# Patient Record
Sex: FEMALE | Race: WHITE | Employment: OTHER | URBAN - NONMETROPOLITAN AREA
[De-identification: names, ages, dates, MRNs, and addresses within clinical notes are randomized per-mention and may not be internally consistent; named-entity substitution may affect disease eponyms.]

---

## 2021-01-16 ENCOUNTER — HOSPITAL ENCOUNTER (EMERGENCY)
Age: 51
Discharge: HOME | End: 2021-01-16
Attending: EMERGENCY MEDICINE
Payer: MEDICAID

## 2021-01-16 ENCOUNTER — APPOINTMENT (OUTPATIENT)
Dept: CT IMAGING | Age: 51
End: 2021-01-16
Attending: EMERGENCY MEDICINE
Payer: MEDICAID

## 2021-01-16 VITALS
HEART RATE: 89 BPM | OXYGEN SATURATION: 100 % | BODY MASS INDEX: 17.93 KG/M2 | SYSTOLIC BLOOD PRESSURE: 122 MMHG | RESPIRATION RATE: 17 BRPM | WEIGHT: 105 LBS | HEIGHT: 64 IN | DIASTOLIC BLOOD PRESSURE: 78 MMHG | TEMPERATURE: 98.4 F

## 2021-01-16 DIAGNOSIS — N20.0 KIDNEY STONE: Primary | ICD-10-CM

## 2021-01-16 LAB
ALBUMIN SERPL-MCNC: 4.3 G/DL (ref 3.5–4.7)
ALBUMIN/GLOB SERPL: 1.8 {RATIO}
ALP SERPL-CCNC: 88 U/L (ref 38–126)
ALT SERPL-CCNC: 19 U/L (ref 3–52)
ANION GAP SERPL CALC-SCNC: 7 MMOL/L
APPEARANCE UR: ABNORMAL
AST SERPL W P-5'-P-CCNC: 18 U/L (ref 14–74)
BACTERIA URNS QL MICRO: ABNORMAL /HPF
BASOPHILS # BLD: 0 K/UL
BASOPHILS NFR BLD: 0 %
BILIRUB SERPL-MCNC: 0.8 MG/DL (ref 0.2–1)
BILIRUB UR QL: NEGATIVE
BUN SERPL-MCNC: 20 MG/DL (ref 9–21)
BUN/CREAT SERPL: 25
CA-I BLD-MCNC: 9.3 MG/DL (ref 8.5–10.5)
CHLORIDE SERPL-SCNC: 108 MMOL/L (ref 94–111)
CO2 SERPL-SCNC: 22 MMOL/L (ref 21–33)
COLOR UR: ABNORMAL
CREAT SERPL-MCNC: 0.8 MG/DL (ref 0.7–1.2)
DIFFERENTIAL METHOD BLD: ABNORMAL
EOSINOPHIL # BLD: 0.2 K/UL
EOSINOPHIL NFR BLD: 1 %
EPITH CASTS URNS QL MICRO: ABNORMAL /LPF (ref 0–20)
ERYTHROCYTE [DISTWIDTH] IN BLOOD BY AUTOMATED COUNT: 13.6 % (ref 11.6–14.5)
GLOBULIN SER CALC-MCNC: 2.4 G/DL
GLUCOSE SERPL-MCNC: 90 MG/DL (ref 70–110)
GLUCOSE UR STRIP.AUTO-MCNC: NEGATIVE MG/DL
HCT VFR BLD AUTO: 39.2 % (ref 35–45)
HGB BLD-MCNC: 13 G/DL (ref 12–16)
HGB UR QL STRIP: ABNORMAL
IMM GRANULOCYTES # BLD AUTO: 0 K/UL
IMM GRANULOCYTES NFR BLD AUTO: 0 %
KETONES UR QL STRIP.AUTO: NEGATIVE MG/DL
LEUKOCYTE ESTERASE UR QL STRIP.AUTO: NEGATIVE
LIPASE SERPL-CCNC: 24 U/L (ref 10–57)
LYMPHOCYTES # BLD: 3.2 K/UL
LYMPHOCYTES NFR BLD: 16 %
MCH RBC QN AUTO: 31.5 PG (ref 24–34)
MCHC RBC AUTO-ENTMCNC: 33.2 G/DL (ref 31–37)
MCV RBC AUTO: 94.9 FL (ref 74–97)
MONOCYTES # BLD: 2 K/UL
MONOCYTES NFR BLD: 10 %
NEUTS BAND NFR BLD MANUAL: 4 %
NEUTS SEG # BLD: 14.4 K/UL
NEUTS SEG NFR BLD: 69 %
NITRITE UR QL STRIP.AUTO: NEGATIVE
PH UR STRIP: 6 [PH] (ref 5–9)
PLATELET # BLD AUTO: 320 K/UL (ref 135–420)
PMV BLD AUTO: 10.5 FL (ref 9.2–11.8)
POTASSIUM SERPL-SCNC: 3.5 MMOL/L (ref 3.2–5.1)
PROT SERPL-MCNC: 6.7 G/DL (ref 6.1–8.4)
PROT UR STRIP-MCNC: NEGATIVE MG/DL
RBC # BLD AUTO: 4.13 M/UL (ref 4.2–5.3)
RBC #/AREA URNS HPF: ABNORMAL /HPF (ref 0–2)
RBC MORPH BLD: ABNORMAL
SODIUM SERPL-SCNC: 137 MMOL/L (ref 135–145)
SP GR UR REFRACTOMETRY: 1.02 (ref 1–1.03)
UROBILINOGEN UR QL STRIP.AUTO: 0.2 EU/DL (ref 0.2–1)
WBC # BLD AUTO: 19.8 K/UL (ref 4.6–13.2)
WBC URNS QL MICRO: ABNORMAL /HPF (ref 0–4)

## 2021-01-16 PROCEDURE — 96375 TX/PRO/DX INJ NEW DRUG ADDON: CPT

## 2021-01-16 PROCEDURE — 81001 URINALYSIS AUTO W/SCOPE: CPT

## 2021-01-16 PROCEDURE — 36415 COLL VENOUS BLD VENIPUNCTURE: CPT

## 2021-01-16 PROCEDURE — 85025 COMPLETE CBC W/AUTO DIFF WBC: CPT

## 2021-01-16 PROCEDURE — 83690 ASSAY OF LIPASE: CPT

## 2021-01-16 PROCEDURE — 80053 COMPREHEN METABOLIC PANEL: CPT

## 2021-01-16 PROCEDURE — 96374 THER/PROPH/DIAG INJ IV PUSH: CPT

## 2021-01-16 PROCEDURE — 74011250636 HC RX REV CODE- 250/636: Performed by: EMERGENCY MEDICINE

## 2021-01-16 PROCEDURE — 74176 CT ABD & PELVIS W/O CONTRAST: CPT

## 2021-01-16 PROCEDURE — 99284 EMERGENCY DEPT VISIT MOD MDM: CPT

## 2021-01-16 RX ORDER — HYDROCODONE BITARTRATE AND ACETAMINOPHEN 5; 325 MG/1; MG/1
1 TABLET ORAL
Qty: 12 TAB | Refills: 0 | Status: SHIPPED | OUTPATIENT
Start: 2021-01-16 | End: 2021-01-19

## 2021-01-16 RX ORDER — PRAVASTATIN SODIUM 40 MG/1
1 TABLET ORAL
COMMUNITY
Start: 2020-12-04

## 2021-01-16 RX ORDER — VARENICLINE TARTRATE 1 MG/1
1 TABLET, FILM COATED ORAL DAILY
COMMUNITY
Start: 2020-12-04

## 2021-01-16 RX ORDER — KETOROLAC TROMETHAMINE 30 MG/ML
15 INJECTION, SOLUTION INTRAMUSCULAR; INTRAVENOUS
Status: COMPLETED | OUTPATIENT
Start: 2021-01-16 | End: 2021-01-16

## 2021-01-16 RX ORDER — ONDANSETRON 2 MG/ML
4 INJECTION INTRAMUSCULAR; INTRAVENOUS
Status: COMPLETED | OUTPATIENT
Start: 2021-01-16 | End: 2021-01-16

## 2021-01-16 RX ADMIN — ONDANSETRON 4 MG: 2 INJECTION INTRAMUSCULAR; INTRAVENOUS at 13:31

## 2021-01-16 RX ADMIN — KETOROLAC TROMETHAMINE 15 MG: 30 INJECTION, SOLUTION INTRAMUSCULAR at 13:34

## 2021-01-16 NOTE — DISCHARGE INSTRUCTIONS
Patient Education        Kidney Stone: Care Instructions  Your Care Instructions     Kidney stones are formed when salts, minerals, and other substances normally found in the urine clump together. They can be as small as grains of sand or, rarely, as large as golf balls. While the stone is traveling through the ureter, which is the tube that carries urine from the kidney to the bladder, you will probably feel pain. The pain may be mild or very severe. You may also have some blood in your urine. As soon as the stone reaches the bladder, any intense pain should go away. If a stone is too large to pass on its own, you may need a medical procedure to help you pass the stone. The doctor has checked you carefully, but problems can develop later. If you notice any problems or new symptoms, get medical treatment right away. Follow-up care is a key part of your treatment and safety. Be sure to make and go to all appointments, and call your doctor if you are having problems. It's also a good idea to know your test results and keep a list of the medicines you take. How can you care for yourself at home? · Drink plenty of fluids, enough so that your urine is light yellow or clear like water. If you have kidney, heart, or liver disease and have to limit fluids, talk with your doctor before you increase the amount of fluids you drink. · Take pain medicines exactly as directed. Call your doctor if you think you are having a problem with your medicine. ? If the doctor gave you a prescription medicine for pain, take it as prescribed. ? If you are not taking a prescription pain medicine, ask your doctor if you can take an over-the-counter medicine. Read and follow all instructions on the label. · Your doctor may ask you to strain your urine so that you can collect your kidney stone when it passes. You can use a kitchen strainer or a tea strainer to catch the stone.  Store it in a plastic bag until you see your doctor again.  Preventing future kidney stones  Some changes in your diet may help prevent kidney stones. Depending on the cause of your stones, your doctor may recommend that you:  · Drink plenty of fluids, enough so that your urine is light yellow or clear like water. If you have kidney, heart, or liver disease and have to limit fluids, talk with your doctor before you increase the amount of fluids you drink.  · Limit coffee, tea, and alcohol. Also avoid grapefruit juice.  · Do not take more than the recommended daily dose of vitamins C and D.  · Avoid antacids such as Gaviscon, Maalox, Mylanta, or Tums.  · Limit the amount of salt (sodium) in your diet.  · Eat a balanced diet that is not too high in protein.  · Limit foods that are high in a substance called oxalate, which can cause kidney stones. These foods include dark green vegetables, rhubarb, chocolate, wheat bran, nuts, cranberries, and beans.  When should you call for help?   Call your doctor now or seek immediate medical care if:    · You cannot keep down fluids.     · Your pain gets worse.     · You have a fever or chills.     · You have new or worse pain in your back just below your rib cage (the flank area).     · You have new or more blood in your urine.   Watch closely for changes in your health, and be sure to contact your doctor if:    · You do not get better as expected.   Where can you learn more?  Go to https://www.[employer].net/[employer]  Enter X633 in the search box to learn more about \"Kidney Stone: Care Instructions.\"  Current as of: April 15, 2020               Content Version: 12.6  © 4305-6305 [employer].   Care instructions adapted under license by [employer] (which disclaims liability or warranty for this information). If you have questions about a medical condition or this instruction, always ask your healthcare professional. [employer] disclaims any warranty or liability for your use of this  information.

## 2021-01-16 NOTE — ED PROVIDER NOTES
EMERGENCY DEPARTMENT HISTORY AND PHYSICAL EXAM      Date: 2021  Patient Name: Cristina Jones    History of Presenting Illness     Chief Complaint   Patient presents with    Flank Pain     left side       History Provided By: Patient    HPI: Cristina Jones is a 45 y.o. female with presenting ambulatory to the ED c/o severe, sharp left flank pain x 2 days. Pt states that the pain is waxing/waning. Pt has a h/o kidney stones, and states that this feels similar. Her last kidney stone was in . She has associated sxs today of urgency, nausea, and mild intermittent chills. She denies vomiting or fever. PCP: Ammon Parsons MD    There are no other complaints, changes, or physical findings at this time. No current facility-administered medications on file prior to encounter. Current Outpatient Medications on File Prior to Encounter   Medication Sig Dispense Refill    pravastatin (PRAVACHOL) 40 mg tablet Take 1 Tab by mouth nightly.  Chantix Continuing Month Box 1 mg tablet Take 1 Tab by mouth daily. Past History     Past Medical History:  Past Medical History:   Diagnosis Date    Hypercholesterolemia        Past Surgical History:  Past Surgical History:   Procedure Laterality Date    HX  SECTION      HX CHOLECYSTECTOMY      HX SPLENECTOMY         Family History:  History reviewed. No pertinent family history. Social History:  Social History     Tobacco Use    Smoking status: Current Every Day Smoker     Packs/day: 0.50    Smokeless tobacco: Never Used   Substance Use Topics    Alcohol use: Yes     Comment: rare    Drug use: Never       Allergies: Allergies   Allergen Reactions    Codeine Hives and Swelling    Penicillins Hives and Swelling         Review of Systems   Review of Systems   Constitutional: Positive for chills (intermittent, mild). Negative for fever. HENT: Negative for congestion, ear pain and sore throat. Eyes: Negative for photophobia and pain. Respiratory: Negative for cough and shortness of breath. Cardiovascular: Negative for chest pain. Gastrointestinal: Positive for nausea. Negative for diarrhea and vomiting. Genitourinary: Positive for flank pain (left) and urgency. Negative for dysuria and hematuria. Skin: Negative for color change and rash. Neurological: Negative for dizziness, weakness, light-headedness and numbness. All other systems reviewed and are negative. Physical Exam   Physical Exam  Vitals signs and nursing note reviewed. Constitutional:       General: She is not in acute distress. Appearance: She is well-developed. She is not diaphoretic. HENT:      Head: Normocephalic and atraumatic. Eyes:      General: No scleral icterus. Extraocular Movements:      Right eye: Normal extraocular motion. Left eye: Normal extraocular motion. Conjunctiva/sclera: Conjunctivae normal.      Pupils: Pupils are equal, round, and reactive to light. Neck:      Musculoskeletal: Normal range of motion and neck supple. Trachea: No tracheal deviation. Cardiovascular:      Rate and Rhythm: Normal rate and regular rhythm. Heart sounds: Normal heart sounds. Pulmonary:      Effort: Pulmonary effort is normal. No respiratory distress. Breath sounds: Normal breath sounds. No stridor. Abdominal:      General: Bowel sounds are normal. There is no distension. Palpations: Abdomen is soft. Tenderness: There is abdominal tenderness. There is left CVA tenderness. There is no rebound. Comments: Left suprapubic TTP   Musculoskeletal: Normal range of motion. Skin:     General: Skin is warm and dry. Capillary Refill: Capillary refill takes less than 2 seconds. Findings: No erythema or rash. Neurological:      Mental Status: She is alert and oriented to person, place, and time. Cranial Nerves: No cranial nerve deficit. Motor: No weakness.       Deep Tendon Reflexes: Reflexes are normal and symmetric. Psychiatric:         Mood and Affect: Mood normal.         Behavior: Behavior normal.         Thought Content: Thought content normal.         Judgment: Judgment normal.         Diagnostic Study Results     Labs -     Recent Results (from the past 12 hour(s))   URINALYSIS W/ RFLX MICROSCOPIC    Collection Time: 01/16/21 12:43 PM   Result Value Ref Range    Color Yellow/Straw      Appearance Hazy (A) Clear      Specific gravity 1.020 1.003 - 1.035      pH (UA) 6.0 5.0 - 9.0      Protein Negative Negative mg/dL    Glucose Negative Negative mg/dL    Ketone Negative Negative mg/dL    Bilirubin Negative Negative      Blood Large (A) Negative      Urobilinogen 0.2 0.2 - 1.0 EU/dL    Nitrites Negative Negative      Leukocyte Esterase Negative Negative     URINE MICROSCOPIC    Collection Time: 01/16/21 12:43 PM   Result Value Ref Range    WBC 0-4 0 - 4 /hpf    RBC  0 - 2 /hpf    Epithelial cells Few 0 - 20 /lpf    Bacteria 1+ (A) None /hpf   CBC WITH AUTOMATED DIFF    Collection Time: 01/16/21  1:30 PM   Result Value Ref Range    WBC 19.8 (H) 4.6 - 13.2 K/uL    RBC 4.13 (L) 4.20 - 5.30 M/uL    HGB 13.0 12.0 - 16.0 g/dL    HCT 39.2 35.0 - 45.0 %    MCV 94.9 74.0 - 97.0 FL    MCH 31.5 24.0 - 34.0 PG    MCHC 33.2 31.0 - 37.0 g/dL    RDW 13.6 11.6 - 14.5 %    PLATELET 804 266 - 236 K/uL    MPV 10.5 9.2 - 11.8 FL    NEUTROPHILS 69 %    BAND NEUTROPHILS 4 %    LYMPHOCYTES 16 %    MONOCYTES 10 %    EOSINOPHILS 1 %    BASOPHILS 0 %    IMMATURE GRANULOCYTES 0 %    ABS. NEUTROPHILS 14.4 K/UL    ABS. LYMPHOCYTES 3.2 K/UL    ABS. MONOCYTES 2.0 K/UL    ABS. EOSINOPHILS 0.2 K/UL    ABS. BASOPHILS 0.0 K/UL    ABS. IMM.  GRANS. 0.0 K/UL    DF Manual      RBC COMMENTS Normocytic, Normochromic     METABOLIC PANEL, COMPREHENSIVE    Collection Time: 01/16/21  1:30 PM   Result Value Ref Range    Sodium 137 135 - 145 mmol/L    Potassium 3.5 3.2 - 5.1 mmol/L    Chloride 108 94 - 111 mmol/L    CO2 22 21 - 33 mmol/L Anion gap 7 mmol/L    Glucose 90 70 - 110 mg/dL    BUN 20 9 - 21 mg/dL    Creatinine 0.80 0.70 - 1.20 mg/dL    BUN/Creatinine ratio 25      GFR est AA >60 ml/min/1.73m2    GFR est non-AA >60 ml/min/1.73m2    Calcium 9.3 8.5 - 10.5 mg/dL    Bilirubin, total 0.8 0.2 - 1.0 mg/dL    AST (SGOT) 18 14 - 74 U/L    ALT (SGPT) 19 3 - 52 U/L    Alk. phosphatase 88 38 - 126 U/L    Protein, total 6.7 6.1 - 8.4 g/dL    Albumin 4.3 3.5 - 4.7 g/dL    Globulin 2.4 g/dL    A-G Ratio 1.8     LIPASE    Collection Time: 01/16/21  1:30 PM   Result Value Ref Range    Lipase 24 10 - 57 U/L       Radiologic Studies -   CT ABD PELV WO CONT   Final Result   IMPRESSION:         1. Left distal ureteral stone with moderate hydronephrosis and hydroureter. 2. Bilateral nephrolithiasis. CT Results  (Last 48 hours)               01/16/21 1325  CT ABD PELV WO CONT Final result    Impression:  IMPRESSION:           1. Left distal ureteral stone with moderate hydronephrosis and hydroureter. 2. Bilateral nephrolithiasis. Narrative:  EXAM: CT of the abdomen and pelvis       INDICATION: Pain. COMPARISON: 9/11/2014. TECHNIQUE: Axial CT imaging of the abdomen and pelvis was performed without   intravenous contrast. Multiplanar reformats were generated. One or more dose reduction techniques were used on this CT: automated exposure   control, adjustment of the mAs and/or kVp according to patient size, and   iterative reconstruction techniques. The specific techniques used on this CT   exam have been documented in the patient's electronic medical record. Digital   Imaging and Communications in Medicine (DICOM) format image data are available   to nonaffiliated external healthcare facilities or entities on a secure, media   free, reciprocally searchable basis with patient authorization for at least a   12-month period after this study. _______________       FINDINGS:       LOWER CHEST: Emphysematous changes. LIVER, BILIARY: Liver is normal. No biliary dilation. Gallbladder is surgically   absent. PANCREAS: Normal.       SPLEEN: Surgically absent. ADRENALS: Normal.       KIDNEYS: Left distal ureteral stone measuring 3 mm with moderate hydronephrosis   and hydroureter. Multiple small bilateral nonobstructing 2 mm renal stones. LYMPH NODES: No enlarged lymph nodes. GASTROINTESTINAL TRACT: No bowel dilation or wall thickening. PELVIC ORGANS: Unremarkable. VASCULATURE: Mild atherosclerotic vascular calcifications. BONES: No acute or aggressive osseous abnormalities identified. OTHER: None.       _______________               CXR Results  (Last 48 hours)    None            Medical Decision Making   I am the first provider for this patient. I reviewed the vital signs, available nursing notes, past medical history, past surgical history, family history and social history. Vital Signs-Reviewed the patient's vital signs. Patient Vitals for the past 12 hrs:   Temp Pulse Resp BP SpO2   01/16/21 1244 98.4 °F (36.9 °C) 100 19 136/84 98 %       Records Reviewed: Nursing Notes and Past Medical Records    ED Course:   Initial assessment performed. The patients presenting problems have been discussed, and they are in agreement with the care plan formulated and outlined with them. I have encouraged them to ask questions as they arise throughout their visit. Provider Notes (Medical Decision Making):   INITIAL CLINICAL IMPRESSION and PLANS:  The patient presents with the primary complaint(s) of: flank pain. Possible DX's to consider as primary, associated with, or exacerbated by include:    1. Kidney stone  2. UTI  3. Pyelonephritis  4. Pancreatitis  5. Radiculopathy    Disposition     Disposition: DISCHARGE    DISCHARGE NOTE:  The patient is ready for discharge.  The patient's signs, symptoms, diagnosis, and discharge instructions have been discussed and the patient and/or family has conveyed their understanding. The patient and/or family is to follow up as recommended or return to the ER should their symptoms worsen. Plan has been discussed and the patient and/or family is in agreement. Written by SATYA Sun, as dictated by Dr. Carmen Vu.      Labs Reviewed   CBC WITH AUTOMATED DIFF - Abnormal; Notable for the following components:       Result Value    WBC 19.8 (*)     RBC 4.13 (*)     All other components within normal limits   URINALYSIS W/ RFLX MICROSCOPIC - Abnormal; Notable for the following components:    Appearance Hazy (*)     Blood Large (*)     All other components within normal limits   URINE MICROSCOPIC - Abnormal; Notable for the following components:    Bacteria 1+ (*)     All other components within normal limits   METABOLIC PANEL, COMPREHENSIVE   LIPASE        Recent Results (from the past 12 hour(s))   URINALYSIS W/ RFLX MICROSCOPIC    Collection Time: 01/16/21 12:43 PM   Result Value Ref Range    Color Yellow/Straw      Appearance Hazy (A) Clear      Specific gravity 1.020 1.003 - 1.035      pH (UA) 6.0 5.0 - 9.0      Protein Negative Negative mg/dL    Glucose Negative Negative mg/dL    Ketone Negative Negative mg/dL    Bilirubin Negative Negative      Blood Large (A) Negative      Urobilinogen 0.2 0.2 - 1.0 EU/dL    Nitrites Negative Negative      Leukocyte Esterase Negative Negative     URINE MICROSCOPIC    Collection Time: 01/16/21 12:43 PM   Result Value Ref Range    WBC 0-4 0 - 4 /hpf    RBC  0 - 2 /hpf    Epithelial cells Few 0 - 20 /lpf    Bacteria 1+ (A) None /hpf   CBC WITH AUTOMATED DIFF    Collection Time: 01/16/21  1:30 PM   Result Value Ref Range    WBC 19.8 (H) 4.6 - 13.2 K/uL    RBC 4.13 (L) 4.20 - 5.30 M/uL    HGB 13.0 12.0 - 16.0 g/dL    HCT 39.2 35.0 - 45.0 %    MCV 94.9 74.0 - 97.0 FL    MCH 31.5 24.0 - 34.0 PG    MCHC 33.2 31.0 - 37.0 g/dL    RDW 13.6 11.6 - 14.5 %    PLATELET 021 321 - 518 K/uL    MPV 10.5 9.2 - 11.8 FL NEUTROPHILS 69 %    BAND NEUTROPHILS 4 %    LYMPHOCYTES 16 %    MONOCYTES 10 %    EOSINOPHILS 1 %    BASOPHILS 0 %    IMMATURE GRANULOCYTES 0 %    ABS. NEUTROPHILS 14.4 K/UL    ABS. LYMPHOCYTES 3.2 K/UL    ABS. MONOCYTES 2.0 K/UL    ABS. EOSINOPHILS 0.2 K/UL    ABS. BASOPHILS 0.0 K/UL    ABS. IMM. GRANS. 0.0 K/UL    DF Manual      RBC COMMENTS Normocytic, Normochromic     METABOLIC PANEL, COMPREHENSIVE    Collection Time: 01/16/21  1:30 PM   Result Value Ref Range    Sodium 137 135 - 145 mmol/L    Potassium 3.5 3.2 - 5.1 mmol/L    Chloride 108 94 - 111 mmol/L    CO2 22 21 - 33 mmol/L    Anion gap 7 mmol/L    Glucose 90 70 - 110 mg/dL    BUN 20 9 - 21 mg/dL    Creatinine 0.80 0.70 - 1.20 mg/dL    BUN/Creatinine ratio 25      GFR est AA >60 ml/min/1.73m2    GFR est non-AA >60 ml/min/1.73m2    Calcium 9.3 8.5 - 10.5 mg/dL    Bilirubin, total 0.8 0.2 - 1.0 mg/dL    AST (SGOT) 18 14 - 74 U/L    ALT (SGPT) 19 3 - 52 U/L    Alk. phosphatase 88 38 - 126 U/L    Protein, total 6.7 6.1 - 8.4 g/dL    Albumin 4.3 3.5 - 4.7 g/dL    Globulin 2.4 g/dL    A-G Ratio 1.8     LIPASE    Collection Time: 01/16/21  1:30 PM   Result Value Ref Range    Lipase 24 10 - 57 U/L        CT ABD PELV WO CONT   Final Result   IMPRESSION:         1. Left distal ureteral stone with moderate hydronephrosis and hydroureter. 2. Bilateral nephrolithiasis. CLINICAL IMPRESSION    1. Kidney stone        PLAN:  1. Current Discharge Medication List      START taking these medications    Details   HYDROcodone-acetaminophen (Norco) 5-325 mg per tablet Take 1 Tab by mouth every six (6) hours as needed for Pain for up to 3 days. Max Daily Amount: 4 Tabs. Qty: 12 Tab, Refills: 0    Associated Diagnoses: Kidney stone           2.    Follow-up Information     Follow up With Specialties Details Why Iva Vicente MD Urology Schedule an appointment as soon as possible for a visit for urology follow up [address] [facility] [address] [facility] EMERGENCY DEPT Emergency Medicine  As needed, If symptoms worsen [address]  [phone]        Return to ED if worse     Diagnosis     Clinical Impression:   1. Kidney stone        By signing my name below, Abena Casanova, attest that this documentation has been prepared under the direction and in presence of Dr. Kellie Roger on 01/16/21.  Electronically signed: Malik Alvarenga, 01/16/21, 12:53 PM

## 2021-04-29 ENCOUNTER — HOSPITAL ENCOUNTER (EMERGENCY)
Age: 51
Discharge: HOME | End: 2021-04-30
Attending: EMERGENCY MEDICINE
Payer: MEDICAID

## 2021-04-29 DIAGNOSIS — D72.829 LEUKOCYTOSIS, UNSPECIFIED TYPE: ICD-10-CM

## 2021-04-29 DIAGNOSIS — S31.41XA VAGINAL LACERATION, INITIAL ENCOUNTER: Primary | ICD-10-CM

## 2021-04-29 LAB
ABO + RH BLD: NORMAL
ALBUMIN SERPL-MCNC: 4.2 G/DL (ref 3.5–4.7)
ALBUMIN/GLOB SERPL: 1.3 {RATIO}
ALP SERPL-CCNC: 99 U/L (ref 38–126)
ALT SERPL-CCNC: 23 U/L (ref 3–52)
ANION GAP SERPL CALC-SCNC: 10 MMOL/L
APPEARANCE UR: ABNORMAL
AST SERPL W P-5'-P-CCNC: 22 U/L (ref 14–74)
BACTERIA URNS QL MICRO: ABNORMAL /HPF
BASOPHILS # BLD: 0 K/UL
BASOPHILS NFR BLD: 0 %
BILIRUB SERPL-MCNC: 0.8 MG/DL (ref 0.2–1)
BILIRUB UR QL: NEGATIVE
BLOOD GROUP ANTIBODIES SERPL: NEGATIVE
BUN SERPL-MCNC: 15 MG/DL (ref 9–21)
BUN/CREAT SERPL: 19
CA-I BLD-MCNC: 9.3 MG/DL (ref 8.5–10.5)
CHLORIDE SERPL-SCNC: 106 MMOL/L (ref 94–111)
CO2 SERPL-SCNC: 23 MMOL/L (ref 21–33)
COLOR UR: ABNORMAL
CREAT SERPL-MCNC: 0.8 MG/DL (ref 0.7–1.2)
DIFFERENTIAL METHOD BLD: ABNORMAL
EOSINOPHIL # BLD: 0 K/UL
EOSINOPHIL NFR BLD: 0 %
EPITH CASTS URNS QL MICRO: ABNORMAL /LPF (ref 0–20)
ERYTHROCYTE [DISTWIDTH] IN BLOOD BY AUTOMATED COUNT: 12.9 % (ref 11.6–14.5)
GLOBULIN SER CALC-MCNC: 3.3 G/DL
GLUCOSE SERPL-MCNC: 112 MG/DL (ref 70–110)
GLUCOSE UR STRIP.AUTO-MCNC: NEGATIVE MG/DL
HCT VFR BLD AUTO: 38.5 % (ref 35–45)
HGB BLD-MCNC: 13.2 G/DL (ref 12–16)
HGB UR QL STRIP: ABNORMAL
IMM GRANULOCYTES # BLD AUTO: 0 K/UL
IMM GRANULOCYTES NFR BLD AUTO: 0 %
KETONES UR QL STRIP.AUTO: NEGATIVE MG/DL
LEUKOCYTE ESTERASE UR QL STRIP.AUTO: NEGATIVE
LYMPHOCYTES # BLD: 5.4 K/UL
LYMPHOCYTES NFR BLD: 25 %
MCH RBC QN AUTO: 32.4 PG (ref 24–34)
MCHC RBC AUTO-ENTMCNC: 34.3 G/DL (ref 31–37)
MCV RBC AUTO: 94.4 FL (ref 74–97)
MONOCYTES # BLD: 2 K/UL
MONOCYTES NFR BLD: 9 %
NEUTS SEG # BLD: 14.3 K/UL
NEUTS SEG NFR BLD: 66 %
NITRITE UR QL STRIP.AUTO: NEGATIVE
PH UR STRIP: 6 [PH] (ref 5–9)
PLATELET # BLD AUTO: 328 K/UL (ref 135–420)
PMV BLD AUTO: 11.1 FL (ref 9.2–11.8)
POTASSIUM SERPL-SCNC: 3.4 MMOL/L (ref 3.2–5.1)
PROT SERPL-MCNC: 7.5 G/DL (ref 6.1–8.4)
PROT UR STRIP-MCNC: 30 MG/DL
RBC # BLD AUTO: 4.08 M/UL (ref 4.2–5.3)
RBC #/AREA URNS HPF: ABNORMAL /HPF (ref 0–2)
RBC MORPH BLD: ABNORMAL
SODIUM SERPL-SCNC: 139 MMOL/L (ref 135–145)
SP GR UR REFRACTOMETRY: 1.02 (ref 1–1.03)
SPECIMEN EXP DATE BLD: NORMAL
UROBILINOGEN UR QL STRIP.AUTO: 4 EU/DL (ref 0.2–1)
WBC # BLD AUTO: 21.7 K/UL (ref 4.6–13.2)
WBC URNS QL MICRO: ABNORMAL /HPF (ref 0–4)

## 2021-04-29 PROCEDURE — 99284 EMERGENCY DEPT VISIT MOD MDM: CPT

## 2021-04-29 PROCEDURE — 36415 COLL VENOUS BLD VENIPUNCTURE: CPT

## 2021-04-29 PROCEDURE — 74011250636 HC RX REV CODE- 250/636: Performed by: EMERGENCY MEDICINE

## 2021-04-29 PROCEDURE — 86901 BLOOD TYPING SEROLOGIC RH(D): CPT

## 2021-04-29 PROCEDURE — 96361 HYDRATE IV INFUSION ADD-ON: CPT

## 2021-04-29 PROCEDURE — 85025 COMPLETE CBC W/AUTO DIFF WBC: CPT

## 2021-04-29 PROCEDURE — 81001 URINALYSIS AUTO W/SCOPE: CPT

## 2021-04-29 PROCEDURE — 96360 HYDRATION IV INFUSION INIT: CPT

## 2021-04-29 PROCEDURE — 80053 COMPREHEN METABOLIC PANEL: CPT

## 2021-04-29 RX ORDER — ASPIRIN 81 MG/1
81 TABLET ORAL DAILY
COMMUNITY

## 2021-04-29 RX ADMIN — SODIUM CHLORIDE 1000 ML: 9 INJECTION, SOLUTION INTRAVENOUS at 21:23

## 2021-04-30 VITALS
BODY MASS INDEX: 17.42 KG/M2 | HEART RATE: 78 BPM | TEMPERATURE: 98.1 F | SYSTOLIC BLOOD PRESSURE: 126 MMHG | HEIGHT: 64 IN | RESPIRATION RATE: 18 BRPM | DIASTOLIC BLOOD PRESSURE: 75 MMHG | WEIGHT: 102 LBS | OXYGEN SATURATION: 98 %

## 2021-04-30 RX ORDER — KETOROLAC TROMETHAMINE 10 MG/1
10 TABLET, FILM COATED ORAL ONCE
Status: DISCONTINUED | OUTPATIENT
Start: 2021-04-30 | End: 2021-04-30 | Stop reason: HOSPADM

## 2021-04-30 RX ORDER — KETOROLAC TROMETHAMINE 10 MG/1
10 TABLET, FILM COATED ORAL
Qty: 15 TAB | Refills: 0 | Status: SHIPPED | OUTPATIENT
Start: 2021-04-30 | End: 2021-05-05

## 2021-04-30 NOTE — ED PROVIDER NOTES
HPI   21-year-old  female presents with a chief complaint of severe vaginal bleeding. Patient states that she said the bleeding began after engaging and extremely rough sexual intercourse approximately 5:30 PM.  She denies using any foreign bodies and said that the bleeding started after her sexual partner inserted his penis extremely hard. Patient complains of nausea and dizziness. She has no prior history of STDs and denies any dysuria or vaginal discharge. Past Medical History:   Diagnosis Date    Hypercholesterolemia        Past Surgical History:   Procedure Laterality Date    HX  SECTION      HX CHOLECYSTECTOMY      HX SPLENECTOMY           No family history on file.     Social History     Socioeconomic History    Marital status: LEGALLY      Spouse name: Not on file    Number of children: Not on file    Years of education: Not on file    Highest education level: Not on file   Occupational History    Not on file   Social Needs    Financial resource strain: Not on file    Food insecurity     Worry: Not on file     Inability: Not on file    Transportation needs     Medical: Not on file     Non-medical: Not on file   Tobacco Use    Smoking status: Current Every Day Smoker     Packs/day: 0.50    Smokeless tobacco: Never Used   Substance and Sexual Activity    Alcohol use: Yes     Comment: rare    Drug use: Never    Sexual activity: Not on file   Lifestyle    Physical activity     Days per week: Not on file     Minutes per session: Not on file    Stress: Not on file   Relationships    Social connections     Talks on phone: Not on file     Gets together: Not on file     Attends Confucianism service: Not on file     Active member of club or organization: Not on file     Attends meetings of clubs or organizations: Not on file     Relationship status: Not on file    Intimate partner violence     Fear of current or ex partner: Not on file     Emotionally abused: Not on file Physically abused: Not on file     Forced sexual activity: Not on file   Other Topics Concern    Not on file   Social History Narrative    Not on file         ALLERGIES: Codeine and Penicillins    Review of Systems   Constitutional: Negative for chills, diaphoresis, fatigue, fever and unexpected weight change. HENT: Negative for congestion, dental problem, ear discharge, ear pain, hearing loss, nosebleeds, postnasal drip, sinus pressure, sore throat, trouble swallowing and voice change. Eyes: Negative for photophobia, pain, discharge, redness and visual disturbance. Respiratory: Negative for cough, chest tightness, shortness of breath, wheezing and stridor. Cardiovascular: Negative for chest pain, palpitations and leg swelling. Gastrointestinal: Negative for abdominal distention, abdominal pain, anal bleeding, blood in stool, constipation, diarrhea, nausea and vomiting. Endocrine: Negative for polydipsia, polyphagia and polyuria. Genitourinary: Positive for vaginal bleeding. Negative for difficulty urinating, dysuria, flank pain, frequency, genital sores, hematuria, menstrual problem, pelvic pain, urgency, vaginal discharge and vaginal pain. Musculoskeletal: Negative for arthralgias, back pain, joint swelling, myalgias, neck pain and neck stiffness. Skin: Negative for color change, rash and wound. Allergic/Immunologic: Negative for immunocompromised state. Neurological: Positive for dizziness and light-headedness. Negative for tremors, seizures, syncope, weakness, numbness and headaches. Hematological: Negative for adenopathy. Does not bruise/bleed easily. Psychiatric/Behavioral: Negative for agitation, confusion, decreased concentration, hallucinations, sleep disturbance and suicidal ideas. The patient is not nervous/anxious. All other systems reviewed and are negative.       Vitals:    04/29/21 2001 04/29/21 2127   BP: 130/87 131/62   Pulse: (!) 129 98   Resp: 22 22   Temp: 98.1 °F (36.7 °C)    SpO2: 99% 98%   Weight: 46.3 kg (102 lb)    Height: 5' 4\" (1.626 m)             Physical Exam  Vitals signs and nursing note reviewed. Constitutional:       General: She is in acute distress. Appearance: She is well-developed. She is not diaphoretic. Comments: 67-year-old thin  female in moderate distress. HENT:      Head: Normocephalic and atraumatic. Right Ear: Tympanic membrane, ear canal and external ear normal.      Left Ear: Tympanic membrane, ear canal and external ear normal.      Nose: Nose normal. No congestion. Mouth/Throat:      Mouth: Mucous membranes are moist.      Pharynx: Oropharynx is clear. No oropharyngeal exudate. Eyes:      General: No scleral icterus. Right eye: No discharge. Left eye: No discharge. Conjunctiva/sclera: Conjunctivae normal.      Pupils: Pupils are equal, round, and reactive to light. Neck:      Thyroid: No thyromegaly. Vascular: No JVD. Trachea: No tracheal deviation. Cardiovascular:      Rate and Rhythm: Regular rhythm. Tachycardia present. Heart sounds: Normal heart sounds. No murmur. No friction rub. No gallop. Pulmonary:      Effort: Pulmonary effort is normal. No respiratory distress. Breath sounds: Normal breath sounds. No stridor. No wheezing or rales. Chest:      Chest wall: No tenderness. Abdominal:      General: Bowel sounds are normal. There is no distension. Palpations: Abdomen is soft. There is no mass. Tenderness: There is no abdominal tenderness. There is no guarding or rebound. Genitourinary:     General: Normal vulva. Vagina: Normal. No vaginal discharge. Rectum: Normal.      Comments: Moderate vaginal bleeding and no bleeding noted from closed cervical os. There is a 2 to 3 cm tear in the right posterior vaginal wall. Bleeding has stopped. Musculoskeletal: Normal range of motion.          General: No swelling, tenderness, deformity or signs of injury. Right lower leg: No edema. Left lower leg: No edema. Lymphadenopathy:      Cervical: No cervical adenopathy. Skin:     General: Skin is warm and dry. Capillary Refill: Capillary refill takes less than 2 seconds. Coloration: Skin is not jaundiced or pale. Findings: No erythema or rash. Neurological:      General: No focal deficit present. Mental Status: She is alert and oriented to person, place, and time. Mental status is at baseline. Cranial Nerves: No cranial nerve deficit. Deep Tendon Reflexes: Reflexes are normal and symmetric. Psychiatric:         Mood and Affect: Mood normal.         Behavior: Behavior normal.         Thought Content: Thought content normal.         Judgment: Judgment normal.          MDM  Number of Diagnoses or Management Options     Amount and/or Complexity of Data Reviewed  Clinical lab tests: ordered and reviewed  Tests in the medicine section of CPT®: ordered and reviewed  Review and summarize past medical records: yes    Risk of Complications, Morbidity, and/or Mortality  Presenting problems: high  Diagnostic procedures: high  Management options: high           Procedures      Orders Placed This Encounter    CBC WITH AUTOMATED DIFF     Standing Status:   Standing     Number of Occurrences:   1    COMPREHENSIVE METABOLIC PANEL     Standing Status:   Standing     Number of Occurrences:   1    URINALYSIS W/ RFLX MICROSCOPIC     Standing Status:   Standing     Number of Occurrences:   1    URINE MICROSCOPIC     Standing Status:   Standing     Number of Occurrences:   1    TYPE & SCREEN     ENTER SURGERY DATE IF FOR PRE-OP TESTING. Standing Status:   Standing     Number of Occurrences:   1     Order Specific Question:   Has patient been transfused or pregnant in the last 3 mos. ? Answer:   Unknown    aspirin delayed-release 81 mg tablet     Sig: Take 81 mg by mouth daily.     sodium chloride 0.9 % bolus infusion 1,000 mL    ketorolac (TORADOL) 10 mg tablet     Sig: Take 1 Tab by mouth every eight (8) hours as needed for Pain for up to 5 days. Dispense:  15 Tab     Refill:  0    ketorolac (TORADOL) tablet 10 mg     Recent Results (from the past 12 hour(s))   CBC WITH AUTOMATED DIFF    Collection Time: 04/29/21  9:34 PM   Result Value Ref Range    WBC 21.7 (H) 4.6 - 13.2 K/uL    RBC 4.08 (L) 4.20 - 5.30 M/uL    HGB 13.2 12.0 - 16.0 g/dL    HCT 38.5 35.0 - 45.0 %    MCV 94.4 74.0 - 97.0 FL    MCH 32.4 24.0 - 34.0 PG    MCHC 34.3 31.0 - 37.0 g/dL    RDW 12.9 11.6 - 14.5 %    PLATELET 318 846 - 384 K/uL    MPV 11.1 9.2 - 11.8 FL    NEUTROPHILS 66 %    LYMPHOCYTES 25 %    MONOCYTES 9 %    EOSINOPHILS 0 %    BASOPHILS 0 %    IMMATURE GRANULOCYTES 0 %    ABS. NEUTROPHILS 14.3 K/UL    ABS. LYMPHOCYTES 5.4 K/UL    ABS. MONOCYTES 2.0 K/UL    ABS. EOSINOPHILS 0.0 K/UL    ABS. BASOPHILS 0.0 K/UL    ABS. IMM. GRANS. 0.0 K/UL    DF Manual      RBC COMMENTS Anisocytosis  1+       TYPE & SCREEN    Collection Time: 04/29/21  9:34 PM   Result Value Ref Range    Crossmatch Expiration 05/02/2021,2359     ABO/Rh(D) A Positive     Antibody screen Negative    METABOLIC PANEL, COMPREHENSIVE    Collection Time: 04/29/21  9:34 PM   Result Value Ref Range    Sodium 139 135 - 145 mmol/L    Potassium 3.4 3.2 - 5.1 mmol/L    Chloride 106 94 - 111 mmol/L    CO2 23 21 - 33 mmol/L    Anion gap 10 mmol/L    Glucose 112 (H) 70 - 110 mg/dL    BUN 15 9 - 21 mg/dL    Creatinine 0.80 0.70 - 1.20 mg/dL    BUN/Creatinine ratio 19      GFR est AA >60 ml/min/1.73m2    GFR est non-AA >60 ml/min/1.73m2    Calcium 9.3 8.5 - 10.5 mg/dL    Bilirubin, total 0.8 0.2 - 1.0 mg/dL    AST (SGOT) 22 14 - 74 U/L    ALT (SGPT) 23 3 - 52 U/L    Alk.  phosphatase 99 38 - 126 U/L    Protein, total 7.5 6.1 - 8.4 g/dL    Albumin 4.2 3.5 - 4.7 g/dL    Globulin 3.3 g/dL    A-G Ratio 1.3     URINALYSIS W/ RFLX MICROSCOPIC    Collection Time: 04/29/21 11:00 PM   Result Value Ref Range Color Rosemarie      Appearance Hazy (A) Clear      Specific gravity 1.020 1.003 - 1.035      pH (UA) 6.0 5.0 - 9.0      Protein 30 (A) Negative mg/dL    Glucose Negative Negative mg/dL    Ketone Negative Negative mg/dL    Bilirubin Negative Negative      Blood Large (A) Negative      Urobilinogen 4.0 (H) 0.2 - 1.0 EU/dL    Nitrites Negative Negative      Leukocyte Esterase Negative Negative     URINE MICROSCOPIC    Collection Time: 04/29/21 11:00 PM   Result Value Ref Range    WBC 0-4 0 - 4 /hpf    RBC 5-10 0 - 2 /hpf    Epithelial cells Few 0 - 20 /lpf    Bacteria 1+ (A) None /hpf         9:27 PM -patient experienced a syncopal episode after she got up to notify nursing staff that she was feeling extremely lightheaded. She fell to the floor and was assisted back to her stretcher. 12:08 AM Upon re-evaluation the patient's symptoms have improved. Pt has non-toxic appearance and condition is stable for discharge. She was informed of her results, instructed to f/u with Dr. Dl Correa and return to the ED upon worsening of symptoms. All questions and concerns were addressed. Diagnosis:   1. Vaginal laceration, initial encounter    2.  Leukocytosis, unspecified type          Disposition: Discharge home    Follow-up Information     Follow up With Specialties Details Why Contact Info    Katelyn Blancas MD Family Medicine Schedule an appointment as soon as possible for a visit in 1 day  [address] [place] [id number] [phone]      [facility] EMERGENCY DEPT Emergency Medicine  As needed, If symptoms worsen [address] Luna MD Obstetrics & Gynecology, Gynecology, Obstetrics Schedule an appointment as soon as possible for a visit today  [address] [place] [address] [phone]            Patient's Medications   Start Taking    KETOROLAC (TORADOL) 10 MG TABLET    Take 1 Tab by mouth every eight (8) hours as needed for Pain for up to 5 days. Continue Taking    ASPIRIN DELAYED-RELEASE 81 MG TABLET    Take 81 mg by mouth daily. CHANTIX CONTINUING MONTH BOX 1 MG TABLET    Take 1 Tab by mouth daily. PRAVASTATIN (PRAVACHOL) 40 MG TABLET    Take 1 Tab by mouth nightly.    These Medications have changed    No medications on file   Stop Taking    No medications on file

## 2021-04-30 NOTE — ED NOTES
I have reviewed discharge instructions with the patient. The patient verbalized understanding. Pt steady on standing, denies pain, denies dizziness.

## 2021-04-30 NOTE — ED NOTES
Pt verbalized understanding to call for assistance prior to getting up from exam stretcher. Pt denies injury.

## 2021-04-30 NOTE — ED NOTES
Pt to door c/o dizziness, pale appearance, pt fell to floor before staff could reach room. Pt assisted back to exam table, EDMD to bedside, assisted with pelvic exam as chaperone. VS per flowsheet. Pt denies pain s/p fall. Pupils equal and reactive.

## 2022-06-28 ENCOUNTER — APPOINTMENT (OUTPATIENT)
Dept: CT IMAGING | Age: 52
End: 2022-06-28
Attending: EMERGENCY MEDICINE
Payer: MEDICAID

## 2022-06-28 ENCOUNTER — HOSPITAL ENCOUNTER (EMERGENCY)
Age: 52
Discharge: HOME | End: 2022-06-29
Attending: EMERGENCY MEDICINE
Payer: MEDICAID

## 2022-06-28 VITALS
HEIGHT: 64 IN | SYSTOLIC BLOOD PRESSURE: 156 MMHG | DIASTOLIC BLOOD PRESSURE: 86 MMHG | HEART RATE: 97 BPM | OXYGEN SATURATION: 99 % | BODY MASS INDEX: 17.51 KG/M2 | TEMPERATURE: 98 F | RESPIRATION RATE: 17 BRPM

## 2022-06-28 DIAGNOSIS — N39.0 URINARY TRACT INFECTION WITHOUT HEMATURIA, SITE UNSPECIFIED: ICD-10-CM

## 2022-06-28 DIAGNOSIS — N20.1 URETEROLITHIASIS: Primary | ICD-10-CM

## 2022-06-28 LAB
ALBUMIN SERPL-MCNC: 4.2 G/DL (ref 3.4–5)
ALBUMIN/GLOB SERPL: 1.1 {RATIO} (ref 0.8–1.7)
ALP SERPL-CCNC: 115 U/L (ref 45–117)
ALT SERPL-CCNC: 33 U/L (ref 13–56)
ANION GAP SERPL CALC-SCNC: 10 MMOL/L (ref 3–18)
AST SERPL W P-5'-P-CCNC: 22 U/L (ref 10–38)
BASOPHILS # BLD: 0.2 K/UL (ref 0–0.1)
BASOPHILS NFR BLD: 1 % (ref 0–2)
BILIRUB DIRECT SERPL-MCNC: 0.1 MG/DL (ref 0–0.2)
BILIRUB SERPL-MCNC: 0.5 MG/DL (ref 0.2–1)
BUN SERPL-MCNC: 21 MG/DL (ref 7–18)
BUN/CREAT SERPL: 21 (ref 12–20)
CA-I BLD-MCNC: 9.4 MG/DL (ref 8.5–10.1)
CHLORIDE SERPL-SCNC: 105 MMOL/L (ref 100–111)
CO2 SERPL-SCNC: 25 MMOL/L (ref 21–32)
CREAT SERPL-MCNC: 1.02 MG/DL (ref 0.6–1.3)
EOSINOPHIL # BLD: 0.2 K/UL (ref 0–0.4)
EOSINOPHIL NFR BLD: 1 % (ref 0–5)
ERYTHROCYTE [DISTWIDTH] IN BLOOD BY AUTOMATED COUNT: 13.5 % (ref 11.6–14.5)
GLOBULIN SER CALC-MCNC: 3.8 G/DL (ref 2–4)
GLUCOSE SERPL-MCNC: 122 MG/DL (ref 74–99)
HCT VFR BLD AUTO: 38.5 % (ref 35–45)
HGB BLD-MCNC: 13.1 G/DL (ref 12–16)
IMM GRANULOCYTES # BLD AUTO: 0 K/UL
IMM GRANULOCYTES NFR BLD AUTO: 0 %
LIPASE SERPL-CCNC: 98 U/L (ref 73–393)
LYMPHOCYTES # BLD: 1.9 K/UL (ref 0.9–3.6)
LYMPHOCYTES NFR BLD: 9 % (ref 21–52)
MCH RBC QN AUTO: 32.3 PG (ref 24–34)
MCHC RBC AUTO-ENTMCNC: 34 G/DL (ref 31–37)
MCV RBC AUTO: 94.8 FL (ref 78–100)
METAMYELOCYTES NFR BLD MANUAL: 1 %
MONOCYTES # BLD: 0.4 K/UL (ref 0.05–1.2)
MONOCYTES NFR BLD: 2 % (ref 3–10)
MYELOCYTES NFR BLD MANUAL: 2 %
NEUTS BAND NFR BLD MANUAL: 2 % (ref 0–5)
NEUTS SEG # BLD: 13.4 K/UL (ref 1.8–8)
NEUTS SEG NFR BLD: 60 % (ref 40–73)
NRBC # BLD: 0 K/UL (ref 0–0.01)
NRBC BLD-RTO: 0 PER 100 WBC
OTHER CELLS NFR BLD MANUAL: 22 %
PLATELET # BLD AUTO: 393 K/UL (ref 135–420)
PMV BLD AUTO: 10.4 FL (ref 9.2–11.8)
POTASSIUM SERPL-SCNC: 3.6 MMOL/L (ref 3.5–5.5)
PROT SERPL-MCNC: 8 G/DL (ref 6.4–8.2)
RBC # BLD AUTO: 4.06 M/UL (ref 4.2–5.3)
RBC MORPH BLD: ABNORMAL
SODIUM SERPL-SCNC: 140 MMOL/L (ref 136–145)
WBC # BLD AUTO: 21.6 K/UL (ref 4.6–13.2)

## 2022-06-28 PROCEDURE — 99284 EMERGENCY DEPT VISIT MOD MDM: CPT

## 2022-06-28 PROCEDURE — 74011250636 HC RX REV CODE- 250/636: Performed by: EMERGENCY MEDICINE

## 2022-06-28 PROCEDURE — 36415 COLL VENOUS BLD VENIPUNCTURE: CPT

## 2022-06-28 PROCEDURE — 96374 THER/PROPH/DIAG INJ IV PUSH: CPT

## 2022-06-28 PROCEDURE — 80048 BASIC METABOLIC PNL TOTAL CA: CPT

## 2022-06-28 PROCEDURE — 83690 ASSAY OF LIPASE: CPT

## 2022-06-28 PROCEDURE — 85025 COMPLETE CBC W/AUTO DIFF WBC: CPT

## 2022-06-28 PROCEDURE — 80076 HEPATIC FUNCTION PANEL: CPT

## 2022-06-28 PROCEDURE — 81001 URINALYSIS AUTO W/SCOPE: CPT

## 2022-06-28 PROCEDURE — 74176 CT ABD & PELVIS W/O CONTRAST: CPT

## 2022-06-28 PROCEDURE — 96375 TX/PRO/DX INJ NEW DRUG ADDON: CPT

## 2022-06-28 RX ORDER — SODIUM CHLORIDE 9 MG/ML
1000 INJECTION, SOLUTION INTRAVENOUS ONCE
Status: COMPLETED | OUTPATIENT
Start: 2022-06-28 | End: 2022-06-28

## 2022-06-28 RX ORDER — ONDANSETRON 2 MG/ML
4 INJECTION INTRAMUSCULAR; INTRAVENOUS
Status: COMPLETED | OUTPATIENT
Start: 2022-06-28 | End: 2022-06-28

## 2022-06-28 RX ORDER — HYDROMORPHONE HYDROCHLORIDE 1 MG/ML
0.5 INJECTION, SOLUTION INTRAMUSCULAR; INTRAVENOUS; SUBCUTANEOUS ONCE
Status: COMPLETED | OUTPATIENT
Start: 2022-06-28 | End: 2022-06-28

## 2022-06-28 RX ORDER — KETOROLAC TROMETHAMINE 30 MG/ML
30 INJECTION, SOLUTION INTRAMUSCULAR; INTRAVENOUS
Status: COMPLETED | OUTPATIENT
Start: 2022-06-28 | End: 2022-06-28

## 2022-06-28 RX ADMIN — SODIUM CHLORIDE 1000 ML: 9 INJECTION, SOLUTION INTRAVENOUS at 23:09

## 2022-06-28 RX ADMIN — KETOROLAC TROMETHAMINE 30 MG: 30 INJECTION, SOLUTION INTRAMUSCULAR at 22:47

## 2022-06-28 RX ADMIN — ONDANSETRON 4 MG: 2 INJECTION INTRAMUSCULAR; INTRAVENOUS at 22:47

## 2022-06-28 RX ADMIN — HYDROMORPHONE HYDROCHLORIDE 0.5 MG: 1 INJECTION, SOLUTION INTRAMUSCULAR; INTRAVENOUS; SUBCUTANEOUS at 22:47

## 2022-06-28 NOTE — Clinical Note
[facility] EMERGENCY DEPT  [address] [id number] [phone]    Work/School Note    Date: 6/28/2022    To Whom It May concern:    Félix Roe was seen and treated today in the emergency room by the following provider(s):  Attending Provider: Wade Ford MD.      Félix Roe is excused from work/school on 06/29/22 and 06/30/22. She is medically clear to return to work/school on 7/1/2022.        Sincerely,          Wagner Cuellar MD

## 2022-06-28 NOTE — Clinical Note
[facility] EMERGENCY DEPT  [address] [address]  [phone]    Work/School Note    Date: 6/28/2022    To Whom It May concern:    Johnny Vargas was seen and treated today in the emergency room by the following provider(s):  Attending Provider: Lon Cuba MD.      Johnny Vargas is excused from work/school on 06/29/22 and 06/30/22. She is medically clear to return to work/school on 7/1/2022.        Sincerely,          Liv Squires

## 2022-06-28 NOTE — Clinical Note
[facility] EMERGENCY DEPT  [address] [address]  [phone]    Work/School Note    Date: 6/28/2022    To Whom It May concern:    Carmelina Stark was seen and treated today in the emergency room by the following provider(s):  Attending Provider: Brain Manjarrez MD.      Carmelina Stark is excused from work/school on 06/29/22 and 06/30/22. She is medically clear to return to work/school on 7/1/2022.        Sincerely,          Silvia Campos MD

## 2022-06-28 NOTE — Clinical Note
[facility] EMERGENCY DEPT  [address] [address]  [phone]    Work/School Note    Date: 6/28/2022    To Whom It May concern:    Bello Melendez was seen and treated today in the emergency room by the following provider(s):  Attending Provider: Ravindra Warner MD.      Bello Melendez is excused from work/school on 06/29/22 and 06/30/22. She is medically clear to return to work/school on 7/1/2022.        Sincerely,          Savannah Carter

## 2022-06-29 LAB
APPEARANCE UR: ABNORMAL
BACTERIA URNS QL MICRO: ABNORMAL /HPF
BILIRUB UR QL: NEGATIVE
COLOR UR: ABNORMAL
EPITH CASTS URNS QL MICRO: ABNORMAL /LPF (ref 0–20)
GLUCOSE UR STRIP.AUTO-MCNC: NEGATIVE MG/DL
HGB UR QL STRIP: ABNORMAL
KETONES UR QL STRIP.AUTO: NEGATIVE MG/DL
LEUKOCYTE ESTERASE UR QL STRIP.AUTO: ABNORMAL
NITRITE UR QL STRIP.AUTO: NEGATIVE
PH UR STRIP: 5 [PH] (ref 5–8)
PROT UR STRIP-MCNC: 100 MG/DL
RBC #/AREA URNS HPF: ABNORMAL /HPF (ref 0–2)
SP GR UR REFRACTOMETRY: 1.03 (ref 1–1.03)
UROBILINOGEN UR QL STRIP.AUTO: 1 EU/DL (ref 0.2–1)
WBC URNS QL MICRO: ABNORMAL /HPF (ref 0–4)

## 2022-06-29 PROCEDURE — 87086 URINE CULTURE/COLONY COUNT: CPT

## 2022-06-29 PROCEDURE — 74011250637 HC RX REV CODE- 250/637: Performed by: EMERGENCY MEDICINE

## 2022-06-29 RX ORDER — TAMSULOSIN HYDROCHLORIDE 0.4 MG/1
0.4 CAPSULE ORAL DAILY
Qty: 10 CAPSULE | Refills: 0 | Status: SHIPPED | OUTPATIENT
Start: 2022-06-29 | End: 2022-07-09

## 2022-06-29 RX ORDER — ONDANSETRON 4 MG/1
4 TABLET, ORALLY DISINTEGRATING ORAL
Qty: 14 TABLET | Refills: 0 | Status: SHIPPED | OUTPATIENT
Start: 2022-06-29

## 2022-06-29 RX ORDER — SULFAMETHOXAZOLE AND TRIMETHOPRIM 800; 160 MG/1; MG/1
1 TABLET ORAL ONCE
Status: COMPLETED | OUTPATIENT
Start: 2022-06-29 | End: 2022-06-29

## 2022-06-29 RX ORDER — SULFAMETHOXAZOLE AND TRIMETHOPRIM 800; 160 MG/1; MG/1
1 TABLET ORAL 2 TIMES DAILY
Qty: 20 TABLET | Refills: 0 | Status: SHIPPED | OUTPATIENT
Start: 2022-06-29 | End: 2022-07-09

## 2022-06-29 RX ORDER — OXYCODONE AND ACETAMINOPHEN 5; 325 MG/1; MG/1
1 TABLET ORAL
Qty: 14 TABLET | Refills: 0 | Status: SHIPPED | OUTPATIENT
Start: 2022-06-29 | End: 2022-07-06

## 2022-06-29 RX ADMIN — SULFAMETHOXAZOLE AND TRIMETHOPRIM 1 TABLET: 800; 160 TABLET ORAL at 01:12

## 2022-06-29 NOTE — DISCHARGE INSTRUCTIONS
Return for pain, fever not resolving with motrin or tylenol, shortness of breath, vomiting, decreased fluid intake, weakness, numbness, dizziness, or any change or concerns or for transfer to facility with urology as discussed now.

## 2022-06-29 NOTE — ED PROVIDER NOTES
Pt c/o severe rt flank pain rad to rlq x one day. No injury or rash. No swelling. No cp or sob. No meds for pain pta. Mild bld noted in urine yesterday. Pain severe, wax/waning. Nothing taken for pain pta. No swelling. No bowel changes. H/o sim pain w kidney stones in past, last one yr ago. Pt denies chance of current pregnancy. Past Medical History:   Diagnosis Date    Hypercholesterolemia        Past Surgical History:   Procedure Laterality Date    HX  SECTION      HX CHOLECYSTECTOMY      HX SPLENECTOMY           History reviewed. No pertinent family history. Social History     Socioeconomic History    Marital status: LEGALLY      Spouse name: Not on file    Number of children: Not on file    Years of education: Not on file    Highest education level: Not on file   Occupational History    Not on file   Tobacco Use    Smoking status: Current Every Day Smoker     Packs/day: 0.50    Smokeless tobacco: Never Used   Vaping Use    Vaping Use: Never used   Substance and Sexual Activity    Alcohol use: Yes     Comment: rare    Drug use: Never    Sexual activity: Not on file   Other Topics Concern    Not on file   Social History Narrative    Not on file     Social Determinants of Health     Financial Resource Strain:     Difficulty of Paying Living Expenses: Not on file   Food Insecurity:     Worried About Running Out of Food in the Last Year: Not on file    Eyad of Food in the Last Year: Not on file   Transportation Needs:     Lack of Transportation (Medical): Not on file    Lack of Transportation (Non-Medical):  Not on file   Physical Activity:     Days of Exercise per Week: Not on file    Minutes of Exercise per Session: Not on file   Stress:     Feeling of Stress : Not on file   Social Connections:     Frequency of Communication with Friends and Family: Not on file    Frequency of Social Gatherings with Friends and Family: Not on file    Attends Orthodox Services: Not on file    Active Member of Clubs or Organizations: Not on file    Attends Club or Organization Meetings: Not on file    Marital Status: Not on file   Intimate Partner Violence:     Fear of Current or Ex-Partner: Not on file    Emotionally Abused: Not on file    Physically Abused: Not on file    Sexually Abused: Not on file   Housing Stability:     Unable to Pay for Housing in the Last Year: Not on file    Number of [place] in the Last Year: Not on file    Unstable Housing in the Last Year: Not on file         ALLERGIES: Codeine and Penicillins    Review of Systems   Constitutional: Negative for fever. HENT: Negative for congestion. Respiratory: Negative for cough and shortness of breath. Cardiovascular: Negative for chest pain. Gastrointestinal: Positive for nausea. Negative for vomiting. Genitourinary: Positive for flank pain and hematuria. Negative for dysuria. Musculoskeletal: Positive for back pain. Skin: Negative for rash. Neurological: Negative for light-headedness. All other systems reviewed and are negative. Vitals:    06/28/22 2233   BP: (!) 156/86   Pulse: 97   Resp: 17   Temp: 98 °F (36.7 °C)   SpO2: 99%   Height: 5' 4\" (1.626 m)            Physical Exam  Vitals and nursing note reviewed. Constitutional:       Appearance: She is well-developed. She is not diaphoretic. HENT:      Head: Normocephalic and atraumatic. Eyes:      Pupils: Pupils are equal, round, and reactive to light. Cardiovascular:      Rate and Rhythm: Normal rate and regular rhythm. Heart sounds: No murmur heard. Pulmonary:      Effort: Pulmonary effort is normal.      Breath sounds: No wheezing. Abdominal:      Palpations: Abdomen is soft. Tenderness: There is no abdominal tenderness. Musculoskeletal:         General: No tenderness. Cervical back: Normal range of motion. Skin:     General: Skin is dry.       Capillary Refill: Capillary refill takes less than 2 seconds. Findings: No rash. Neurological:      Mental Status: She is alert and oriented to person, place, and time. Psychiatric:         Mood and Affect: Mood normal.          MDM       Procedures      Vitals:  Patient Vitals for the past 12 hrs:   Temp Pulse Resp BP SpO2   06/28/22 2233 98 °F (36.7 °C) 97 17 (!) 156/86 99 %         Medications ordered:   Medications   trimethoprim-sulfamethoxazole (BACTRIM DS, SEPTRA DS) 160-800 mg per tablet 1 Tablet (has no administration in time range)   HYDROmorphone (DILAUDID) injection 0.5 mg (0.5 mg IntraVENous Given 6/28/22 2247)   ondansetron (ZOFRAN) injection 4 mg (4 mg IntraVENous Given 6/28/22 2247)   ketorolac (TORADOL) injection 30 mg (30 mg IntraVENous Given 6/28/22 2247)   0.9% sodium chloride infusion 1,000 mL (1,000 mL IntraVENous New Bag 6/28/22 2309)         Lab findings:  Recent Results (from the past 12 hour(s))   CBC WITH AUTOMATED DIFF    Collection Time: 06/28/22 10:40 PM   Result Value Ref Range    WBC 21.6 (H) 4.6 - 13.2 K/uL    RBC 4.06 (L) 4.20 - 5.30 M/uL    HGB 13.1 12.0 - 16.0 g/dL    HCT 38.5 35.0 - 45.0 %    MCV 94.8 78.0 - 100.0 FL    MCH 32.3 24.0 - 34.0 PG    MCHC 34.0 31.0 - 37.0 g/dL    RDW 13.5 11.6 - 14.5 %    PLATELET 662 422 - 260 K/uL    MPV 10.4 9.2 - 11.8 FL    NRBC 0.0 0.0  WBC    ABSOLUTE NRBC 0.00 0.00 - 0.01 K/uL    NEUTROPHILS 60 40 - 73 %    BAND NEUTROPHILS 2 0 - 5 %    LYMPHOCYTES 9 (L) 21 - 52 %    MONOCYTES 2 (L) 3 - 10 %    EOSINOPHILS 1 0 - 5 %    BASOPHILS 1 0 - 2 %    METAMYELOCYTES 1 (H) 0 %    MYELOCYTES 2 (H) 0 %    OTHER CELL 22 %    IMMATURE GRANULOCYTES 0 %    ABS. NEUTROPHILS 13.4 (H) 1.8 - 8.0 K/UL    ABS. LYMPHOCYTES 1.9 0.9 - 3.6 K/UL    ABS. MONOCYTES 0.4 0.05 - 1.2 K/UL    ABS. EOSINOPHILS 0.2 0.0 - 0.4 K/UL    ABS. BASOPHILS 0.2 (H) 0.0 - 0.1 K/UL    ABS. IMM.  GRANS. 0.0 K/UL    RBC COMMENTS Normocytic, Normochromic     METABOLIC PANEL, BASIC    Collection Time: 06/28/22 10:40 PM   Result Value Ref Range    Sodium 140 136 - 145 mmol/L    Potassium 3.6 3.5 - 5.5 mmol/L    Chloride 105 100 - 111 mmol/L    CO2 25 21 - 32 mmol/L    Anion gap 10 3.0 - 18.0 mmol/L    Glucose 122 (H) 74 - 99 mg/dL    BUN 21 (H) 7 - 18 mg/dL    Creatinine 1.02 0.60 - 1.30 mg/dL    BUN/Creatinine ratio 21 (H) 12 - 20      GFR est AA >60 >60 ml/min/1.73m2    GFR est non-AA 57 (L) >60 ml/min/1.73m2    Calcium 9.4 8.5 - 10.1 mg/dL   LIPASE    Collection Time: 06/28/22 10:40 PM   Result Value Ref Range    Lipase 98 73 - 393 U/L   HEPATIC FUNCTION PANEL    Collection Time: 06/28/22 10:40 PM   Result Value Ref Range    Protein, total 8.0 6.4 - 8.2 g/dL    Albumin 4.2 3.4 - 5.0 g/dL    Globulin 3.8 2.0 - 4.0 g/dL    A-G Ratio 1.1 0.8 - 1.7      Bilirubin, total 0.5 0.2 - 1.0 mg/dL    Bilirubin, direct 0.1 0.0 - 0.2 mg/dL    Alk. phosphatase 115 45 - 117 U/L    AST (SGOT) 22 10 - 38 U/L    ALT (SGPT) 33 13 - 56 U/L   URINALYSIS W/ RFLX MICROSCOPIC    Collection Time: 06/28/22 11:46 PM   Result Value Ref Range    Color Result ok'd by sup/chief tech      Appearance Turbid      Specific gravity 1.030 1.005 - 1.030      pH (UA) 5.0 5.0 - 8.0      Protein 100 (A) Negative mg/dL    Glucose Negative Negative mg/dL    Ketone Negative Negative mg/dL    Bilirubin Negative Negative      Blood Large (A) Negative      Urobilinogen 1.0 0.2 - 1.0 EU/dL    Nitrites Negative Negative      Leukocyte Esterase Small (A) Negative     URINE MICROSCOPIC    Collection Time: 06/28/22 11:46 PM   Result Value Ref Range    WBC 10-20 0 - 4 /hpf    RBC 20-50 0 - 2 /hpf    Epithelial cells Moderate 0 - 20 /lpf    Bacteria 1+ (A) None /hpf           X-Ray, CT or other radiology findings or impressions:  CT ABD PELV WO CONT   Final Result         1. Obstructive uropathy on the right side due to a mid pelvic 3 mm calculus. There is also nonobstructing bilateral nephrolithiasis.    2. Cholecystectomy and splenectomy with accessory splenic tissue seen as several   nodules in the anterior left abdomen, unchanged. Progress notes, Consult notes or additional Procedure notes:   10:39 PM Long discussion with patient regarding risks/benefits of cat scan. Risks discussed including risk of cancer from radiation/fibrosis. Pt verbalizes understanding of risks and strongly wants ct, pt is very concerned regarding the pain. 11:08 PM elev wbc but afebrile,  Pt says h/o chronic leukocytosis, unchanged. Per labs, is unchanged per pt.   1:01 AM pt pain free, no complaints, req dc, told of all findings, concern for infection, pt declines tx for urology admit or further testing eval now, req dc and close f/u. To dc per pt, detailed ret inst given. Af, nl vitals, no sx's,  Alert, non toxic. No emc. Pt urged to ret for transfer for urology admit for any sx's. Diagnosis:   1. Ureterolithiasis    2. Urinary tract infection without hematuria, site unspecified        Disposition: home    Follow-up Information     Follow up With Specialties Details Why Contact Info    [facility] EMERGENCY DEPT Emergency Medicine Go to  As needed, If symptoms worsen [address]    Vi Becker MD Family Medicine   [address] [facility] [address]  [address]  [phone]      [facility]rology of [facility]  Schedule an appointment as soon as possible for a visit in 2 days  [address] [place], [place] [address]e S  [phone]           Patient's Medications   Start Taking    ONDANSETRON (ZOFRAN ODT) 4 MG DISINTEGRATING TABLET    Take 1 Tablet by mouth every eight (8) hours as needed for Nausea or Vomiting. OXYCODONE-ACETAMINOPHEN (PERCOCET) 5-325 MG PER TABLET    Take 1 Tablet by mouth every six (6) hours as needed for Pain for up to 7 days. Max Daily Amount: 4 Tablets. TAMSULOSIN (FLOMAX) 0.4 MG CAPSULE    Take 1 Capsule by mouth daily for 10 days.     TRIMETHOPRIM-SULFAMETHOXAZOLE (BACTRIM DS) 160-800 MG PER TABLET    Take 1 Tablet by mouth two (2) times a day for 10 days. Continue Taking    ASPIRIN DELAYED-RELEASE 81 MG TABLET    Take 81 mg by mouth daily. CHANTIX CONTINUING MONTH BOX 1 MG TABLET    Take 1 Tab by mouth daily. PRAVASTATIN (PRAVACHOL) 40 MG TABLET    Take 1 Tab by mouth nightly.    These Medications have changed    No medications on file   Stop Taking    No medications on file

## 2022-06-29 NOTE — ED TRIAGE NOTES
Patient reports yesterday she noticed some blood when wiping after urinating. Today patient started having right flank pain and nausea. Patient has a hx of kidney stones and states this feels similar.

## 2022-06-30 LAB
BACTERIA SPEC CULT: NORMAL
SPECIAL REQUESTS,SREQ: NORMAL

## 2022-07-29 ENCOUNTER — HOSPITAL ENCOUNTER (EMERGENCY)
Age: 52
Discharge: HOME | End: 2022-07-29
Attending: EMERGENCY MEDICINE
Payer: MEDICAID

## 2022-07-29 ENCOUNTER — APPOINTMENT (OUTPATIENT)
Dept: CT IMAGING | Age: 52
End: 2022-07-29
Attending: EMERGENCY MEDICINE
Payer: MEDICAID

## 2022-07-29 VITALS
TEMPERATURE: 98 F | DIASTOLIC BLOOD PRESSURE: 82 MMHG | OXYGEN SATURATION: 100 % | HEART RATE: 82 BPM | BODY MASS INDEX: 17.93 KG/M2 | SYSTOLIC BLOOD PRESSURE: 132 MMHG | RESPIRATION RATE: 18 BRPM | HEIGHT: 64 IN | WEIGHT: 105 LBS

## 2022-07-29 DIAGNOSIS — N20.0 KIDNEY STONES: Primary | ICD-10-CM

## 2022-07-29 LAB
ALBUMIN SERPL-MCNC: 4 G/DL (ref 3.4–5)
ALBUMIN/GLOB SERPL: 1 {RATIO} (ref 0.8–1.7)
ALP SERPL-CCNC: 103 U/L (ref 45–117)
ALT SERPL-CCNC: 24 U/L (ref 13–56)
ANION GAP SERPL CALC-SCNC: 7 MMOL/L (ref 3–18)
APPEARANCE UR: CLEAR
AST SERPL W P-5'-P-CCNC: 19 U/L (ref 10–38)
BACTERIA URNS QL MICRO: ABNORMAL /HPF
BASOPHILS # BLD: 0.1 K/UL (ref 0–0.1)
BASOPHILS NFR BLD: 1 % (ref 0–2)
BILIRUB SERPL-MCNC: 0.3 MG/DL (ref 0.2–1)
BILIRUB UR QL: NEGATIVE
BUN SERPL-MCNC: 19 MG/DL (ref 7–18)
BUN/CREAT SERPL: 20 (ref 12–20)
CA-I BLD-MCNC: 9.6 MG/DL (ref 8.5–10.1)
CHLORIDE SERPL-SCNC: 105 MMOL/L (ref 100–111)
CO2 SERPL-SCNC: 24 MMOL/L (ref 21–32)
COLOR UR: YELLOW
CREAT SERPL-MCNC: 0.95 MG/DL (ref 0.6–1.3)
DIFFERENTIAL METHOD BLD: ABNORMAL
EOSINOPHIL # BLD: 0.1 K/UL (ref 0–0.4)
EOSINOPHIL NFR BLD: 0 % (ref 0–5)
EPITH CASTS URNS QL MICRO: ABNORMAL /LPF (ref 0–20)
ERYTHROCYTE [DISTWIDTH] IN BLOOD BY AUTOMATED COUNT: 13.4 % (ref 11.6–14.5)
GLOBULIN SER CALC-MCNC: 4 G/DL (ref 2–4)
GLUCOSE SERPL-MCNC: 114 MG/DL (ref 74–99)
GLUCOSE UR STRIP.AUTO-MCNC: NEGATIVE MG/DL
HCT VFR BLD AUTO: 37.7 % (ref 35–45)
HGB BLD-MCNC: 12.7 G/DL (ref 12–16)
HGB UR QL STRIP: ABNORMAL
IMM GRANULOCYTES # BLD AUTO: 0.1 K/UL (ref 0–0.04)
IMM GRANULOCYTES NFR BLD AUTO: 1 % (ref 0–0.5)
KETONES UR QL STRIP.AUTO: NEGATIVE MG/DL
LEUKOCYTE ESTERASE UR QL STRIP.AUTO: NEGATIVE
LYMPHOCYTES # BLD: 3 K/UL (ref 0.9–3.6)
LYMPHOCYTES NFR BLD: 16 % (ref 21–52)
MCH RBC QN AUTO: 32.2 PG (ref 24–34)
MCHC RBC AUTO-ENTMCNC: 33.7 G/DL (ref 31–37)
MCV RBC AUTO: 95.7 FL (ref 78–100)
MONOCYTES # BLD: 1.2 K/UL (ref 0.05–1.2)
MONOCYTES NFR BLD: 6 % (ref 3–10)
NEUTS SEG # BLD: 14.6 K/UL (ref 1.8–8)
NEUTS SEG NFR BLD: 76 % (ref 40–73)
NITRITE UR QL STRIP.AUTO: NEGATIVE
NRBC # BLD: 0 K/UL (ref 0–0.01)
NRBC BLD-RTO: 0 PER 100 WBC
PH UR STRIP: 5.5 [PH] (ref 5–8)
PLATELET # BLD AUTO: 348 K/UL (ref 135–420)
PMV BLD AUTO: 10.8 FL (ref 9.2–11.8)
POTASSIUM SERPL-SCNC: 3.9 MMOL/L (ref 3.5–5.5)
PROT SERPL-MCNC: 8 G/DL (ref 6.4–8.2)
PROT UR STRIP-MCNC: 30 MG/DL
RBC # BLD AUTO: 3.94 M/UL (ref 4.2–5.3)
RBC #/AREA URNS HPF: ABNORMAL /HPF (ref 0–2)
SODIUM SERPL-SCNC: 136 MMOL/L (ref 136–145)
SP GR UR REFRACTOMETRY: 1.03 (ref 1–1.03)
UROBILINOGEN UR QL STRIP.AUTO: 1 EU/DL (ref 0.2–1)
WBC # BLD AUTO: 19 K/UL (ref 4.6–13.2)
WBC URNS QL MICRO: ABNORMAL /HPF (ref 0–4)

## 2022-07-29 PROCEDURE — 96375 TX/PRO/DX INJ NEW DRUG ADDON: CPT

## 2022-07-29 PROCEDURE — 96376 TX/PRO/DX INJ SAME DRUG ADON: CPT

## 2022-07-29 PROCEDURE — 85025 COMPLETE CBC W/AUTO DIFF WBC: CPT

## 2022-07-29 PROCEDURE — 81001 URINALYSIS AUTO W/SCOPE: CPT

## 2022-07-29 PROCEDURE — 96374 THER/PROPH/DIAG INJ IV PUSH: CPT

## 2022-07-29 PROCEDURE — 80053 COMPREHEN METABOLIC PANEL: CPT

## 2022-07-29 PROCEDURE — 99281 EMR DPT VST MAYX REQ PHY/QHP: CPT

## 2022-07-29 PROCEDURE — 74011250636 HC RX REV CODE- 250/636: Performed by: EMERGENCY MEDICINE

## 2022-07-29 PROCEDURE — 99284 EMERGENCY DEPT VISIT MOD MDM: CPT

## 2022-07-29 PROCEDURE — 96361 HYDRATE IV INFUSION ADD-ON: CPT

## 2022-07-29 PROCEDURE — 74176 CT ABD & PELVIS W/O CONTRAST: CPT

## 2022-07-29 RX ORDER — ONDANSETRON 2 MG/ML
4 INJECTION INTRAMUSCULAR; INTRAVENOUS
Status: COMPLETED | OUTPATIENT
Start: 2022-07-29 | End: 2022-07-29

## 2022-07-29 RX ORDER — MORPHINE SULFATE 4 MG/ML
4 INJECTION, SOLUTION INTRAMUSCULAR; INTRAVENOUS
Status: COMPLETED | OUTPATIENT
Start: 2022-07-29 | End: 2022-07-29

## 2022-07-29 RX ORDER — HYDROMORPHONE HYDROCHLORIDE 1 MG/ML
1 INJECTION, SOLUTION INTRAMUSCULAR; INTRAVENOUS; SUBCUTANEOUS
Status: COMPLETED | OUTPATIENT
Start: 2022-07-29 | End: 2022-07-29

## 2022-07-29 RX ORDER — IBUPROFEN 600 MG/1
600 TABLET ORAL
Qty: 20 TABLET | Refills: 0 | Status: SHIPPED | OUTPATIENT
Start: 2022-07-29

## 2022-07-29 RX ORDER — HYDROCODONE BITARTRATE AND ACETAMINOPHEN 5; 325 MG/1; MG/1
1 TABLET ORAL
Qty: 12 TABLET | Refills: 0 | Status: SHIPPED | OUTPATIENT
Start: 2022-07-29 | End: 2022-08-01

## 2022-07-29 RX ORDER — KETOROLAC TROMETHAMINE 30 MG/ML
15 INJECTION, SOLUTION INTRAMUSCULAR; INTRAVENOUS
Status: COMPLETED | OUTPATIENT
Start: 2022-07-29 | End: 2022-07-29

## 2022-07-29 RX ORDER — SULFAMETHOXAZOLE AND TRIMETHOPRIM 800; 160 MG/1; MG/1
1 TABLET ORAL 2 TIMES DAILY
Qty: 14 TABLET | Refills: 0 | Status: SHIPPED | OUTPATIENT
Start: 2022-07-29 | End: 2022-08-05

## 2022-07-29 RX ORDER — ONDANSETRON 4 MG/1
4 TABLET, FILM COATED ORAL
Qty: 20 TABLET | Refills: 0 | Status: SHIPPED | OUTPATIENT
Start: 2022-07-29

## 2022-07-29 RX ORDER — TAMSULOSIN HYDROCHLORIDE 0.4 MG/1
0.4 CAPSULE ORAL DAILY
Qty: 15 CAPSULE | Refills: 0 | Status: SHIPPED | OUTPATIENT
Start: 2022-07-29 | End: 2022-08-13

## 2022-07-29 RX ADMIN — SODIUM CHLORIDE 1000 ML: 9 INJECTION, SOLUTION INTRAVENOUS at 09:00

## 2022-07-29 RX ADMIN — ONDANSETRON 4 MG: 2 INJECTION INTRAMUSCULAR; INTRAVENOUS at 08:50

## 2022-07-29 RX ADMIN — HYDROMORPHONE HYDROCHLORIDE 1 MG: 1 INJECTION, SOLUTION INTRAMUSCULAR; INTRAVENOUS; SUBCUTANEOUS at 10:02

## 2022-07-29 RX ADMIN — KETOROLAC TROMETHAMINE 15 MG: 30 INJECTION, SOLUTION INTRAMUSCULAR at 10:02

## 2022-07-29 RX ADMIN — MORPHINE SULFATE 4 MG: 4 INJECTION, SOLUTION INTRAMUSCULAR; INTRAVENOUS at 11:18

## 2022-07-29 RX ADMIN — MORPHINE SULFATE 4 MG: 4 INJECTION, SOLUTION INTRAMUSCULAR; INTRAVENOUS at 08:50

## 2022-07-29 NOTE — ED TRIAGE NOTES
Patient arrives ambulatory to the ER in visible discomfort with c/o left flank pain that started suddenly around 0300. She reports being seen in the ER a few weeks ago for a kidney stone to the right side and reports these symptoms are the same. Denies fever or hematuria. She took a prescribed pain medication from her last visit with minimal pain relief.

## 2022-09-20 ENCOUNTER — TRANSCRIBE ORDER (OUTPATIENT)
Dept: SCHEDULING | Age: 52
End: 2022-09-20

## 2022-09-20 DIAGNOSIS — R07.9 CHEST PAIN, UNSPECIFIED: Primary | ICD-10-CM

## 2022-11-01 ENCOUNTER — HOSPITAL ENCOUNTER (OUTPATIENT)
Dept: NON INVASIVE DIAGNOSTICS | Age: 52
Discharge: HOME | End: 2022-11-01
Attending: NURSE PRACTITIONER
Payer: MEDICAID

## 2022-11-01 VITALS
SYSTOLIC BLOOD PRESSURE: 127 MMHG | BODY MASS INDEX: 17.93 KG/M2 | WEIGHT: 105 LBS | HEIGHT: 64 IN | DIASTOLIC BLOOD PRESSURE: 73 MMHG

## 2022-11-01 DIAGNOSIS — R07.9 CHEST PAIN, UNSPECIFIED: ICD-10-CM

## 2022-11-01 LAB
ECHO AO ROOT DIAM: 2.5 CM
ECHO AO ROOT INDEX: 1.68 CM/M2
ECHO AV MEAN GRADIENT: 2 MMHG
ECHO AV MEAN VELOCITY: 0.6 M/S
ECHO AV PEAK GRADIENT: 4 MMHG
ECHO AV PEAK VELOCITY: 0.9 M/S
ECHO AV VELOCITY RATIO: 0.89
ECHO AV VTI: 19.3 CM
ECHO EST RA PRESSURE: 3 MMHG
ECHO LA DIAMETER INDEX: 1.88 CM/M2
ECHO LA DIAMETER: 2.8 CM
ECHO LA TO AORTIC ROOT RATIO: 1.12
ECHO LA VOL 2C: 24 ML (ref 22–52)
ECHO LA VOL 4C: 26 ML (ref 22–52)
ECHO LA VOL BP: 26 ML (ref 22–52)
ECHO LA VOL/BSA BIPLANE: 17 ML/M2 (ref 16–34)
ECHO LA VOLUME AREA LENGTH: 27 ML
ECHO LA VOLUME INDEX A2C: 16 ML/M2 (ref 16–34)
ECHO LA VOLUME INDEX A4C: 17 ML/M2 (ref 16–34)
ECHO LA VOLUME INDEX AREA LENGTH: 18 ML/M2 (ref 16–34)
ECHO LV E' LATERAL VELOCITY: 13 CM/S
ECHO LV E' SEPTAL VELOCITY: 11 CM/S
ECHO LV EDV A4C: 50 ML
ECHO LV EDV INDEX A4C: 34 ML/M2
ECHO LV EJECTION FRACTION A2C: 60 %
ECHO LV EJECTION FRACTION A4C: 69 %
ECHO LV EJECTION FRACTION BIPLANE: 65 % (ref 55–100)
ECHO LV ESV A4C: 15 ML
ECHO LV ESV INDEX A4C: 10 ML/M2
ECHO LV FRACTIONAL SHORTENING: 27 % (ref 28–44)
ECHO LV INTERNAL DIMENSION DIASTOLE INDEX: 2.48 CM/M2
ECHO LV INTERNAL DIMENSION DIASTOLIC: 3.7 CM (ref 3.9–5.3)
ECHO LV INTERNAL DIMENSION SYSTOLIC INDEX: 1.81 CM/M2
ECHO LV INTERNAL DIMENSION SYSTOLIC: 2.7 CM
ECHO LV IVSD: 0.8 CM (ref 0.6–0.9)
ECHO LV MASS 2D: 82.3 G (ref 67–162)
ECHO LV MASS INDEX 2D: 55.2 G/M2 (ref 43–95)
ECHO LV POSTERIOR WALL DIASTOLIC: 0.8 CM (ref 0.6–0.9)
ECHO LV RELATIVE WALL THICKNESS RATIO: 0.43
ECHO LVOT AV VTI INDEX: 0.8
ECHO LVOT MEAN GRADIENT: 1 MMHG
ECHO LVOT PEAK GRADIENT: 2 MMHG
ECHO LVOT PEAK VELOCITY: 0.8 M/S
ECHO LVOT VTI: 15.4 CM
ECHO MV A VELOCITY: 0.79 M/S
ECHO MV E DECELERATION TIME (DT): 201.9 MS
ECHO MV E VELOCITY: 0.81 M/S
ECHO MV E/A RATIO: 1.03
ECHO MV E/E' LATERAL: 6.23
ECHO MV E/E' RATIO (AVERAGED): 6.8
ECHO MV E/E' SEPTAL: 7.36
ECHO MV LVOT VTI INDEX: 1.47
ECHO MV MAX VELOCITY: 0.8 M/S
ECHO MV MEAN GRADIENT: 1 MMHG
ECHO MV MEAN VELOCITY: 0.5 M/S
ECHO MV PEAK GRADIENT: 3 MMHG
ECHO MV VTI: 22.6 CM
ECHO PV MAX VELOCITY: 0.9 M/S
ECHO PV PEAK GRADIENT: 3 MMHG
ECHO RIGHT VENTRICULAR SYSTOLIC PRESSURE (RVSP): 21 MMHG
ECHO RV TAPSE: 1.9 CM (ref 1.7–?)
ECHO TV REGURGITANT MAX VELOCITY: 2.1 M/S
ECHO TV REGURGITANT PEAK GRADIENT: 18 MMHG

## 2022-11-01 PROCEDURE — 93306 TTE W/DOPPLER COMPLETE: CPT

## 2023-09-26 ENCOUNTER — HOSPITAL ENCOUNTER (EMERGENCY)
Age: 53
Discharge: HOME | End: 2023-09-26
Attending: FAMILY MEDICINE
Payer: MEDICAID

## 2023-09-26 ENCOUNTER — APPOINTMENT (OUTPATIENT)
Age: 53
End: 2023-09-26
Payer: MEDICAID

## 2023-09-26 VITALS
BODY MASS INDEX: 18.78 KG/M2 | TEMPERATURE: 98 F | HEART RATE: 80 BPM | HEIGHT: 64 IN | SYSTOLIC BLOOD PRESSURE: 133 MMHG | RESPIRATION RATE: 16 BRPM | DIASTOLIC BLOOD PRESSURE: 74 MMHG | OXYGEN SATURATION: 98 % | WEIGHT: 110 LBS

## 2023-09-26 DIAGNOSIS — S92.501A CLOSED FRACTURE OF PHALANX OF RIGHT SECOND TOE, INITIAL ENCOUNTER: Primary | ICD-10-CM

## 2023-09-26 PROCEDURE — 99283 EMERGENCY DEPT VISIT LOW MDM: CPT

## 2023-09-26 PROCEDURE — 73630 X-RAY EXAM OF FOOT: CPT

## 2023-09-26 RX ORDER — HYDROCODONE BITARTRATE AND ACETAMINOPHEN 5; 325 MG/1; MG/1
1 TABLET ORAL NIGHTLY PRN
Qty: 5 TABLET | Refills: 0 | Status: SHIPPED | OUTPATIENT
Start: 2023-09-26 | End: 2023-10-01

## 2023-09-26 RX ORDER — ROSUVASTATIN CALCIUM 10 MG/1
TABLET, COATED ORAL
COMMUNITY
Start: 2023-09-01

## 2023-09-26 ASSESSMENT — PAIN DESCRIPTION - ORIENTATION
ORIENTATION: RIGHT
ORIENTATION: LEFT

## 2023-09-26 ASSESSMENT — LIFESTYLE VARIABLES
HOW OFTEN DO YOU HAVE A DRINK CONTAINING ALCOHOL: NEVER
HOW MANY STANDARD DRINKS CONTAINING ALCOHOL DO YOU HAVE ON A TYPICAL DAY: PATIENT DOES NOT DRINK

## 2023-09-26 ASSESSMENT — PAIN DESCRIPTION - LOCATION
LOCATION: TOE (COMMENT WHICH ONE)
LOCATION: FOOT

## 2023-09-26 ASSESSMENT — PAIN SCALES - GENERAL
PAINLEVEL_OUTOF10: 5
PAINLEVEL_OUTOF10: 5

## 2023-09-26 ASSESSMENT — PAIN - FUNCTIONAL ASSESSMENT
PAIN_FUNCTIONAL_ASSESSMENT: 0-10
PAIN_FUNCTIONAL_ASSESSMENT: 0-10

## 2023-09-26 ASSESSMENT — PAIN DESCRIPTION - DESCRIPTORS: DESCRIPTORS: ACHING

## 2023-09-27 ASSESSMENT — ENCOUNTER SYMPTOMS
RESPIRATORY NEGATIVE: 1
COLOR CHANGE: 1

## 2023-09-27 NOTE — DISCHARGE INSTRUCTIONS
Wear your post-op shoe daily. Donnell tape your 2nd and 3rd toes daily. Follow up with your Primary Doctor in 1-2 weeks to recheck on fracture healing. Return to the ED for new or worsening symptoms. Rest, ice and elevate the foot.  Alternate Tylenol and Motrin during the day for pain control

## 2023-09-27 NOTE — ED PROVIDER NOTES
Care time was 0 minutes, excluding separately reportable procedures. There was a high probability of clinically significant/life threatening deterioration in the patient's condition which required my urgent intervention. CONSULTS:  None    PROCEDURES:  Unless otherwise noted below, none     Procedures        FINAL IMPRESSION      1. Closed fracture of phalanx of right second toe, initial encounter          DISPOSITION/PLAN   DISPOSITION Decision To Discharge 09/26/2023 09:28:02 PM      PATIENT REFERRED TO:  Dayan Reza MD  [facility]  [phone]    Schedule an appointment as soon as possible for a visit in 1 week        DISCHARGE MEDICATIONS:  New Prescriptions    HYDROCODONE-ACETAMINOPHEN (NORCO) 5-325 MG PER TABLET    Take 1 tablet by mouth nightly as needed for Pain for up to 5 days. Intended supply: 3 days.  Take lowest dose possible to manage pain Max Daily Amount: 1 tablet     Controlled Substances Monitoring:          No data to display                (Please note that portions of this note were completed with a voice recognition program.  Efforts were made to edit the dictations but occasionally words are mis-transcribed.)    Durga Browning DO (electronically signed)  Attending Emergency Physician            Durga Browning DO  09/27/23 1508

## 2023-09-27 NOTE — ED NOTES
I have reviewed discharge instructions with the patient. The patient verbalized understanding. Patient encouraged to return to ER with any other concerns or emergent care needed. Patient escorted to waiting room with steady gait and no distress noted.      [address] [place]  09/26/23 2141

## 2024-02-05 ENCOUNTER — HOSPITAL ENCOUNTER (OUTPATIENT)
Age: 54
Discharge: HOME | End: 2024-02-08
Payer: MEDICAID

## 2024-02-05 DIAGNOSIS — R74.01 TRANSAMINITIS: ICD-10-CM

## 2024-02-05 PROCEDURE — 76700 US EXAM ABDOM COMPLETE: CPT

## 2024-12-23 ENCOUNTER — APPOINTMENT (OUTPATIENT)
Age: 54
End: 2024-12-23
Payer: MEDICAID

## 2024-12-23 ENCOUNTER — HOSPITAL ENCOUNTER (EMERGENCY)
Age: 54
Discharge: HOME | End: 2024-12-23
Attending: FAMILY MEDICINE
Payer: MEDICAID

## 2024-12-23 VITALS
DIASTOLIC BLOOD PRESSURE: 76 MMHG | TEMPERATURE: 98.8 F | OXYGEN SATURATION: 94 % | RESPIRATION RATE: 18 BRPM | WEIGHT: 108 LBS | HEIGHT: 64 IN | BODY MASS INDEX: 18.44 KG/M2 | HEART RATE: 74 BPM | SYSTOLIC BLOOD PRESSURE: 119 MMHG

## 2024-12-23 DIAGNOSIS — N20.1 CALCULUS OF URETEROVESICAL JUNCTION (UVJ): Primary | ICD-10-CM

## 2024-12-23 DIAGNOSIS — K59.00 CONSTIPATION, UNSPECIFIED CONSTIPATION TYPE: ICD-10-CM

## 2024-12-23 LAB
ALBUMIN SERPL-MCNC: 4.1 G/DL (ref 3.4–5)
ALBUMIN/GLOB SERPL: 1 (ref 0.8–1.7)
ALP SERPL-CCNC: 112 U/L (ref 45–117)
ALT SERPL-CCNC: 60 U/L (ref 13–56)
ANION GAP SERPL CALC-SCNC: 5 MMOL/L (ref 3–18)
APPEARANCE UR: CLEAR
AST SERPL W P-5'-P-CCNC: 44 U/L (ref 10–38)
BACTERIA URNS QL MICRO: ABNORMAL /HPF
BASOPHILS # BLD: 0.1 K/UL (ref 0–0.1)
BASOPHILS NFR BLD: 1 % (ref 0–2)
BILIRUB SERPL-MCNC: 0.4 MG/DL (ref 0.2–1)
BILIRUB UR QL: NEGATIVE
BUN SERPL-MCNC: 14 MG/DL (ref 7–18)
BUN/CREAT SERPL: 21 (ref 12–20)
CA-I BLD-MCNC: 9.5 MG/DL (ref 8.5–10.1)
CHLORIDE SERPL-SCNC: 109 MMOL/L (ref 100–111)
CO2 SERPL-SCNC: 27 MMOL/L (ref 21–32)
COLOR UR: YELLOW
CREAT SERPL-MCNC: 0.67 MG/DL (ref 0.6–1.3)
DIFFERENTIAL METHOD BLD: ABNORMAL
EOSINOPHIL # BLD: 0.4 K/UL (ref 0–0.4)
EOSINOPHIL NFR BLD: 4 % (ref 0–5)
EPITH CASTS URNS QL MICRO: ABNORMAL /LPF (ref 0–20)
ERYTHROCYTE [DISTWIDTH] IN BLOOD BY AUTOMATED COUNT: 13.4 % (ref 11.6–14.5)
GLOBULIN SER CALC-MCNC: 4.2 G/DL (ref 2–4)
GLUCOSE SERPL-MCNC: 87 MG/DL (ref 74–99)
GLUCOSE UR STRIP.AUTO-MCNC: NEGATIVE MG/DL
HCT VFR BLD AUTO: 40.9 % (ref 35–45)
HGB BLD-MCNC: 14 G/DL (ref 12–16)
HGB UR QL STRIP: ABNORMAL
IMM GRANULOCYTES # BLD AUTO: 0 K/UL
IMM GRANULOCYTES NFR BLD AUTO: 0 %
KETONES UR QL STRIP.AUTO: NEGATIVE MG/DL
LEUKOCYTE ESTERASE UR QL STRIP.AUTO: ABNORMAL
LYMPHOCYTES # BLD: 5.9 K/UL (ref 0.9–3.6)
LYMPHOCYTES NFR BLD: 57 % (ref 21–52)
MCH RBC QN AUTO: 32.7 PG (ref 24–34)
MCHC RBC AUTO-ENTMCNC: 34.2 G/DL (ref 31–37)
MCV RBC AUTO: 95.6 FL (ref 78–100)
MONOCYTES # BLD: 0.8 K/UL (ref 0.05–1.2)
MONOCYTES NFR BLD: 8 % (ref 3–10)
NEUTS SEG # BLD: 3.1 K/UL (ref 1.8–8)
NEUTS SEG NFR BLD: 30 % (ref 40–73)
NITRITE UR QL STRIP.AUTO: NEGATIVE
NRBC # BLD: 0 K/UL (ref 0–0.01)
NRBC BLD-RTO: 0 PER 100 WBC
PH UR STRIP: 6 (ref 5–8)
PLATELET # BLD AUTO: 432 K/UL (ref 135–420)
PMV BLD AUTO: 10.4 FL (ref 9.2–11.8)
POTASSIUM SERPL-SCNC: 3.8 MMOL/L (ref 3.5–5.5)
PROT SERPL-MCNC: 8.3 G/DL (ref 6.4–8.2)
PROT UR STRIP-MCNC: NEGATIVE MG/DL
RBC # BLD AUTO: 4.28 M/UL (ref 4.2–5.3)
RBC #/AREA URNS HPF: ABNORMAL /HPF (ref 0–2)
RBC MORPH BLD: ABNORMAL
SODIUM SERPL-SCNC: 141 MMOL/L (ref 136–145)
SP GR UR REFRACTOMETRY: 1.01 (ref 1–1.03)
URINE CULTURE IF INDICATED: ABNORMAL
UROBILINOGEN UR QL STRIP.AUTO: 0.2 EU/DL (ref 0.2–1)
WBC # BLD AUTO: 10.3 K/UL (ref 4.6–13.2)
WBC URNS QL MICRO: ABNORMAL /HPF (ref 0–4)

## 2024-12-23 PROCEDURE — 80053 COMPREHEN METABOLIC PANEL: CPT

## 2024-12-23 PROCEDURE — 81001 URINALYSIS AUTO W/SCOPE: CPT

## 2024-12-23 PROCEDURE — 99284 EMERGENCY DEPT VISIT MOD MDM: CPT

## 2024-12-23 PROCEDURE — 6360000002 HC RX W HCPCS: Performed by: FAMILY MEDICINE

## 2024-12-23 PROCEDURE — 96374 THER/PROPH/DIAG INJ IV PUSH: CPT

## 2024-12-23 PROCEDURE — 6370000000 HC RX 637 (ALT 250 FOR IP): Performed by: FAMILY MEDICINE

## 2024-12-23 PROCEDURE — 85025 COMPLETE CBC W/AUTO DIFF WBC: CPT

## 2024-12-23 PROCEDURE — 74176 CT ABD & PELVIS W/O CONTRAST: CPT

## 2024-12-23 RX ORDER — TAMSULOSIN HYDROCHLORIDE 0.4 MG/1
0.4 CAPSULE ORAL DAILY
Qty: 30 CAPSULE | Refills: 0 | Status: SHIPPED | OUTPATIENT
Start: 2024-12-23

## 2024-12-23 RX ORDER — KETOROLAC TROMETHAMINE 30 MG/ML
15 INJECTION, SOLUTION INTRAMUSCULAR; INTRAVENOUS ONCE
Status: COMPLETED | OUTPATIENT
Start: 2024-12-23 | End: 2024-12-23

## 2024-12-23 RX ORDER — KETOROLAC TROMETHAMINE 10 MG/1
10 TABLET, FILM COATED ORAL EVERY 6 HOURS PRN
Qty: 20 TABLET | Refills: 0 | Status: SHIPPED | OUTPATIENT
Start: 2024-12-23

## 2024-12-23 RX ORDER — TAMSULOSIN HYDROCHLORIDE 0.4 MG/1
0.4 CAPSULE ORAL
Status: COMPLETED | OUTPATIENT
Start: 2024-12-23 | End: 2024-12-23

## 2024-12-23 RX ORDER — NITROFURANTOIN 25; 75 MG/1; MG/1
100 CAPSULE ORAL 2 TIMES DAILY
COMMUNITY

## 2024-12-23 RX ADMIN — KETOROLAC TROMETHAMINE 15 MG: 30 INJECTION, SOLUTION INTRAMUSCULAR at 19:22

## 2024-12-23 RX ADMIN — TAMSULOSIN HYDROCHLORIDE 0.4 MG: 0.4 CAPSULE ORAL at 19:22

## 2024-12-23 ASSESSMENT — PAIN SCALES - GENERAL
PAINLEVEL_OUTOF10: 5
PAINLEVEL_OUTOF10: 0

## 2024-12-23 ASSESSMENT — PAIN DESCRIPTION - DESCRIPTORS: DESCRIPTORS: BURNING

## 2024-12-23 ASSESSMENT — PAIN DESCRIPTION - LOCATION: LOCATION: OTHER (COMMENT)

## 2024-12-23 NOTE — ED TRIAGE NOTES
Pt states she has had hematuria x 4 weeks   pt has been seen by PCP x 2 for the pain and bleeding  pt had bacteria in her blood   she was started on ciprofloxacin, however before she completed it, her PCP told her to stop taking in and he called her in macrobid (started3 days ago)  Pt had another urinalysis, which showed blood and bacteria   PCP thinks pt may have a stone so they sent her to the ED to have blood work and CT.

## 2024-12-24 NOTE — ED PROVIDER NOTES
taking: Reported on 9/26/2023)      ondansetron (ZOFRAN) 4 MG tablet Take 4 mg by mouth every 8 hours as needed (Patient not taking: Reported on 9/26/2023)      varenicline (CHANTIX) 1 MG tablet Take 1 tablet by mouth daily (Patient not taking: Reported on 9/26/2023)         Social History:  Social History     Tobacco Use    Smoking status: Every Day     Current packs/day: 0.50     Types: Cigarettes    Smokeless tobacco: Never   Substance Use Topics    Alcohol use: Yes    Drug use: Never       Allergies:  Allergies   Allergen Reactions    Codeine Hives and Swelling    Penicillins Hives and Swelling       All the above components of the past  history are auto-populated from the electronic record.  They have been reviewed and the patient has been interviewed for any pertinent past history that pertains to the patient's chief complaint and reason for visit.  Not all pre-populated components may be accurate at the time this note was generated.    Review of Systems   Review of Systems   All other systems reviewed and are negative.      Physical Exam   Physical Exam  Vitals and nursing note reviewed.   Constitutional:       General: She is not in acute distress.     Appearance: Normal appearance. She is not ill-appearing or toxic-appearing.   HENT:      Head: Normocephalic and atraumatic.      Nose: Nose normal.      Mouth/Throat:      Mouth: Mucous membranes are moist.      Pharynx: Oropharynx is clear.   Eyes:      Extraocular Movements: Extraocular movements intact.      Conjunctiva/sclera: Conjunctivae normal.      Pupils: Pupils are equal, round, and reactive to light.   Cardiovascular:      Rate and Rhythm: Normal rate and regular rhythm.      Pulses: Normal pulses.      Heart sounds: Normal heart sounds.   Pulmonary:      Effort: Pulmonary effort is normal.      Breath sounds: Normal breath sounds.   Abdominal:      General: Abdomen is flat. Bowel sounds are normal. There is no distension.      Palpations: Abdomen 
No

## 2025-03-11 SDOH — HEALTH STABILITY: PHYSICAL HEALTH: ON AVERAGE, HOW MANY DAYS PER WEEK DO YOU ENGAGE IN MODERATE TO STRENUOUS EXERCISE (LIKE A BRISK WALK)?: 0 DAYS

## 2025-03-11 SDOH — HEALTH STABILITY: PHYSICAL HEALTH: ON AVERAGE, HOW MANY MINUTES DO YOU ENGAGE IN EXERCISE AT THIS LEVEL?: 0 MIN

## 2025-03-12 ENCOUNTER — OFFICE VISIT (OUTPATIENT)
Dept: FAMILY MEDICINE CLINIC | Facility: CLINIC | Age: 55
End: 2025-03-12
Payer: MEDICAID

## 2025-03-12 VITALS
OXYGEN SATURATION: 95 % | TEMPERATURE: 97.8 F | DIASTOLIC BLOOD PRESSURE: 80 MMHG | HEIGHT: 64 IN | BODY MASS INDEX: 18.35 KG/M2 | WEIGHT: 107.5 LBS | HEART RATE: 78 BPM | SYSTOLIC BLOOD PRESSURE: 132 MMHG | RESPIRATION RATE: 18 BRPM

## 2025-03-12 DIAGNOSIS — Z11.4 SCREENING FOR HIV WITHOUT PRESENCE OF RISK FACTORS: ICD-10-CM

## 2025-03-12 DIAGNOSIS — E53.8 COBALAMIN DEFICIENCY: ICD-10-CM

## 2025-03-12 DIAGNOSIS — R73.9 HYPERGLYCEMIA: ICD-10-CM

## 2025-03-12 DIAGNOSIS — Z00.00 WELLNESS EXAMINATION: ICD-10-CM

## 2025-03-12 DIAGNOSIS — Z13.29 THYROID DISORDER SCREEN: ICD-10-CM

## 2025-03-12 DIAGNOSIS — Z11.59 NEED FOR HEPATITIS C SCREENING TEST: ICD-10-CM

## 2025-03-12 DIAGNOSIS — E55.9 VITAMIN D DEFICIENCY: ICD-10-CM

## 2025-03-12 DIAGNOSIS — Z87.42 HISTORY OF ABNORMAL CERVICAL PAP SMEAR: ICD-10-CM

## 2025-03-12 DIAGNOSIS — E78.5 HYPERLIPIDEMIA, UNSPECIFIED HYPERLIPIDEMIA TYPE: ICD-10-CM

## 2025-03-12 DIAGNOSIS — Z00.00 WELLNESS EXAMINATION: Primary | ICD-10-CM

## 2025-03-12 DIAGNOSIS — Z12.11 SCREENING FOR MALIGNANT NEOPLASM OF COLON: ICD-10-CM

## 2025-03-12 DIAGNOSIS — Z12.31 ENCOUNTER FOR SCREENING MAMMOGRAM FOR BREAST CANCER: ICD-10-CM

## 2025-03-12 PROCEDURE — 99204 OFFICE O/P NEW MOD 45 MIN: CPT

## 2025-03-12 RX ORDER — FLUTICASONE PROPIONATE 50 MCG
1 SPRAY, SUSPENSION (ML) NASAL DAILY PRN
COMMUNITY

## 2025-03-12 SDOH — ECONOMIC STABILITY: FOOD INSECURITY: WITHIN THE PAST 12 MONTHS, YOU WORRIED THAT YOUR FOOD WOULD RUN OUT BEFORE YOU GOT MONEY TO BUY MORE.: NEVER TRUE

## 2025-03-12 SDOH — ECONOMIC STABILITY: FOOD INSECURITY: WITHIN THE PAST 12 MONTHS, THE FOOD YOU BOUGHT JUST DIDN'T LAST AND YOU DIDN'T HAVE MONEY TO GET MORE.: NEVER TRUE

## 2025-03-12 ASSESSMENT — ENCOUNTER SYMPTOMS
ALLERGIC/IMMUNOLOGIC NEGATIVE: 1
EYES NEGATIVE: 1
RESPIRATORY NEGATIVE: 1
CONSTIPATION: 1

## 2025-03-12 ASSESSMENT — PATIENT HEALTH QUESTIONNAIRE - PHQ9
SUM OF ALL RESPONSES TO PHQ QUESTIONS 1-9: 0
SUM OF ALL RESPONSES TO PHQ QUESTIONS 1-9: 0
2. FEELING DOWN, DEPRESSED OR HOPELESS: NOT AT ALL
1. LITTLE INTEREST OR PLEASURE IN DOING THINGS: NOT AT ALL
SUM OF ALL RESPONSES TO PHQ QUESTIONS 1-9: 0
SUM OF ALL RESPONSES TO PHQ QUESTIONS 1-9: 0

## 2025-03-12 NOTE — PROGRESS NOTES
Debra Rosario is a 55 y.o. female presents with   Chief Complaint   Patient presents with    New Patient     55-year-old female here to establish care.  She has a past medical history of hyperlipidemia and is currently on rosuvastatin 10 mg.  She also states that she has a history of abnormal Pap smears since her 20s she states she has had multiple biopsies with no explanation.  She has not had a Pap smear several years and would like to have this I discussed with her referring her to GYN as I would refer her there if her Pap in office was abnormal.  She also complains of constipation she states she has had constipation for the last several months, today she states she is feeling better and less bloated.  She has never had a colonoscopy I will place that order and I discussed with her the importance of having colonoscopy and preventative care.  She denies any complaints today.     Diagnosis   1. Wellness examination          2. Screening for HIV without presence of risk factors       3. Need for hepatitis C screening test       4. Encounter for screening mammogram for breast cancer       5. Hyperglycemia       6. Hyperlipidemia, unspecified hyperlipidemia type       7. Thyroid disorder screen       8. Cobalamin deficiency       9. Vitamin D deficiency       10. Screening for malignant neoplasm of colon       11. History of abnormal cervical Pap smear         /80 (BP Site: Right Upper Arm, Patient Position: Sitting, BP Cuff Size: Large Adult)   Pulse 78   Temp 97.8 °F (36.6 °C) (Temporal)   Resp 18   Ht 1.613 m (5' 3.5\")   Wt 48.8 kg (107 lb 8 oz)   SpO2 95%   BMI 18.74 kg/m²   Subjective:     Past Medical History:   Diagnosis Date    Hypercholesterolemia     Renal calculi      Past Surgical History:   Procedure Laterality Date     SECTION      CHOLECYSTECTOMY      SPLENECTOMY      UROLOGICAL SURGERY  2025    CYSTOSCOPY, BILATERAL RETROGRADE PYELOGRAPHY, BILATERAL URETEROSCOPY, RIGHT

## 2025-03-12 NOTE — PROGRESS NOTES
Debra Rosario presents today for   Chief Complaint   Patient presents with    New Patient       Is someone accompanying this pt? no    Is the patient using any DME equipment during OV? no    Depression Screening:      3/12/2025     1:35 PM   PHQ-9 Questionaire   Little interest or pleasure in doing things 0   Feeling down, depressed, or hopeless 0   PHQ-9 Total Score 0       Fall Risk       No data to display                 Health Maintenance reviewed and discussed and ordered per Provider.    Health Maintenance Due   Topic Date Due    Hib vaccine (1 of 1 - Risk 1-dose series) Never done    Meningococcal (ACWY) vaccine (1 - Risk 2-dose series) Never done    Lipids  Never done    Meningococcal B vaccine (1 of 5 - Increased Risk) Never done    Depression Screen  Never done    HIV screen  Never done    Hepatitis C screen  Never done    Pneumococcal 50+ years Vaccine (1 of 2 - PCV) Never done    Cervical cancer screen  Never done    Breast cancer screen  Never done    Colorectal Cancer Screen  Never done    Shingles vaccine (1 of 2) 01/17/2019    DTaP/Tdap/Td vaccine (2 - Td or Tdap) 11/09/2022    Flu vaccine (1) 08/01/2024    COVID-19 Vaccine (1 - 2024-25 season) Never done   .      \"Have you been to the ER, urgent care clinic since your last visit?  Hospitalized since your last visit?\"    NO    “Have you seen or consulted any other health care providers outside our system since your last visit?”    NO    Have you had a mammogram?”   NO - ordered    No breast cancer screening on file      “Have you had a pap smear?”    NO    No cervical cancer screening on file       “Have you had a colorectal cancer screening such as a colonoscopy/FIT/Cologuard?    NO - ordered    No colonoscopy on file  No cologuard on file  No FIT/FOBT on file   No flexible sigmoidoscopy on file       Click Here for Release of Records Request

## 2025-03-24 ENCOUNTER — TELEPHONE (OUTPATIENT)
Age: 55
End: 2025-03-24

## 2025-03-24 NOTE — TELEPHONE ENCOUNTER
Referral for a screening colonoscopy. Please review and advise.    Referral  Referral # [id number]  Referral Information    Referral # Creation Date Referral Status Status Update    [id number] 03/12/2025 Open 03/12/2025: Status History     Status Reason Referral Type Referral Reasons Referral Class   none Eval and Treat Specialty Services Required Internal     To Specialty To Provider To Location/Place of Service To Department   Gastroenterology Brad Quintana MD none [facility] - GASTROENTEROLOGY     To Vendor Referred By By Location/Place of Service By Department   none Angeles Wiley APRN - NP [facility]     Priority Start Date Expiration Date Referral Entered By   Routine 03/12/2025 03/12/2026 Angeles Wiley APRN - NP     Visits Requested Visits Authorized Visits Completed Visits Scheduled   1 1       Coverages    Payer Plan Auth. Required? Covered? Member # Authorized From Expires Auth # Precert. # Comment   [facility] MEDICAID ANTHEM [facility] [employer] PLUS -- Covered [id number] 4/1/2020 -- -- -- --     Referral Information    Referral # Creation Date Referral Status Status Update    [id number] 03/12/2025 Open 03/12/2025: Status History     Status Reason Referral Type Referral Reasons Referral Class   none Eval and Treat Specialty Services Required Internal     To Specialty To Provider To Location/Place of Service To Department   Gastroenterology Brad Quintana MD none [facility] - GASTROENTEROLOGY     To Vendor Referred By By Location/Place of Service By Department   none Angeles Wiley APRN - NP [facility] MED     Priority Start Date Expiration Date Referral Entered By   Routine 03/12/2025 03/12/2026 Angeles Wiley APRN - NP     Visits Requested Visits Authorized Visits Completed Visits Scheduled   1 1       Procedure Information    Service Details  Procedure

## 2025-03-27 ENCOUNTER — HOSPITAL ENCOUNTER (OUTPATIENT)
Age: 55
Discharge: HOME | End: 2025-03-30

## 2025-03-27 LAB — LABCORP DRAW FEE: NORMAL

## 2025-03-27 PROCEDURE — 99001 SPECIMEN HANDLING PT-LAB: CPT

## 2025-03-28 LAB
25(OH)D3+25(OH)D2 SERPL-MCNC: 33 NG/ML (ref 30–100)
ALBUMIN SERPL-MCNC: 4.3 G/DL (ref 3.8–4.9)
ALP SERPL-CCNC: 111 IU/L (ref 44–121)
ALT SERPL-CCNC: 61 IU/L (ref 0–32)
AST SERPL-CCNC: 55 IU/L (ref 0–40)
BILIRUB SERPL-MCNC: 0.4 MG/DL (ref 0–1.2)
BUN SERPL-MCNC: 14 MG/DL (ref 6–24)
BUN/CREAT SERPL: 21 (ref 9–23)
CALCIUM SERPL-MCNC: 9.6 MG/DL (ref 8.7–10.2)
CHLORIDE SERPL-SCNC: 104 MMOL/L (ref 96–106)
CHOLEST SERPL-MCNC: 154 MG/DL (ref 100–199)
CO2 SERPL-SCNC: 22 MMOL/L (ref 20–29)
CREAT SERPL-MCNC: 0.67 MG/DL (ref 0.57–1)
EGFRCR SERPLBLD CKD-EPI 2021: 103 ML/MIN/1.73
ERYTHROCYTE [DISTWIDTH] IN BLOOD BY AUTOMATED COUNT: 12.5 % (ref 11.7–15.4)
GLOBULIN SER CALC-MCNC: 2.7 G/DL (ref 1.5–4.5)
GLUCOSE SERPL-MCNC: 111 MG/DL (ref 70–99)
HBA1C MFR BLD: 5.4 % (ref 4.8–5.6)
HCT VFR BLD AUTO: 39.2 % (ref 34–46.6)
HCV IGG SERPL QL IA: REACTIVE
HDLC SERPL-MCNC: 35 MG/DL
HGB BLD-MCNC: 13.6 G/DL (ref 11.1–15.9)
HIV 1+2 AB+HIV1 P24 AG SERPL QL IA: NON REACTIVE
LDLC SERPL CALC-MCNC: 100 MG/DL (ref 0–99)
MCH RBC QN AUTO: 32.4 PG (ref 26.6–33)
MCHC RBC AUTO-ENTMCNC: 34.7 G/DL (ref 31.5–35.7)
MCV RBC AUTO: 93 FL (ref 79–97)
PLATELET # BLD AUTO: 386 X10E3/UL (ref 150–450)
POTASSIUM SERPL-SCNC: 4.5 MMOL/L (ref 3.5–5.2)
PROT SERPL-MCNC: 7 G/DL (ref 6–8.5)
RBC # BLD AUTO: 4.2 X10E6/UL (ref 3.77–5.28)
SODIUM SERPL-SCNC: 140 MMOL/L (ref 134–144)
TRIGL SERPL-MCNC: 105 MG/DL (ref 0–149)
TSH SERPL DL<=0.005 MIU/L-ACNC: 1.37 UIU/ML (ref 0.45–4.5)
VIT B12 SERPL-MCNC: 395 PG/ML (ref 232–1245)
VLDLC SERPL CALC-MCNC: 19 MG/DL (ref 5–40)
WBC # BLD AUTO: 9.2 X10E3/UL (ref 3.4–10.8)

## 2025-04-02 ENCOUNTER — OFFICE VISIT (OUTPATIENT)
Dept: FAMILY MEDICINE CLINIC | Facility: CLINIC | Age: 55
End: 2025-04-02
Payer: MEDICAID

## 2025-04-02 VITALS
HEIGHT: 64 IN | HEART RATE: 69 BPM | SYSTOLIC BLOOD PRESSURE: 120 MMHG | DIASTOLIC BLOOD PRESSURE: 74 MMHG | BODY MASS INDEX: 18.27 KG/M2 | TEMPERATURE: 97.8 F | OXYGEN SATURATION: 96 % | RESPIRATION RATE: 18 BRPM | WEIGHT: 107 LBS

## 2025-04-02 DIAGNOSIS — R76.8 HEPATITIS C ANTIBODY DETECTED: Primary | ICD-10-CM

## 2025-04-02 DIAGNOSIS — J01.00 ACUTE NON-RECURRENT MAXILLARY SINUSITIS: ICD-10-CM

## 2025-04-02 PROCEDURE — 99213 OFFICE O/P EST LOW 20 MIN: CPT

## 2025-04-02 RX ORDER — DOXYCYCLINE HYCLATE 100 MG
100 TABLET ORAL 2 TIMES DAILY
Qty: 20 TABLET | Refills: 0 | Status: SHIPPED | OUTPATIENT
Start: 2025-04-02 | End: 2025-04-12

## 2025-04-02 RX ORDER — ONDANSETRON 4 MG/1
4 TABLET, ORALLY DISINTEGRATING ORAL EVERY 8 HOURS PRN
Qty: 30 TABLET | Refills: 1 | Status: SHIPPED | OUTPATIENT
Start: 2025-04-02

## 2025-04-02 RX ORDER — ASPIRIN 81 MG/1
81 TABLET ORAL DAILY
Qty: 30 TABLET | Refills: 3 | Status: SHIPPED | OUTPATIENT
Start: 2025-04-02

## 2025-04-02 RX ORDER — FLUTICASONE PROPIONATE 50 MCG
1 SPRAY, SUSPENSION (ML) NASAL DAILY PRN
Qty: 16 G | Refills: 1 | Status: SHIPPED | OUTPATIENT
Start: 2025-04-02

## 2025-04-02 RX ORDER — ROSUVASTATIN CALCIUM 10 MG/1
10 TABLET, COATED ORAL DAILY
Qty: 90 TABLET | Refills: 1 | Status: SHIPPED | OUTPATIENT
Start: 2025-04-02

## 2025-04-02 ASSESSMENT — PATIENT HEALTH QUESTIONNAIRE - PHQ9
2. FEELING DOWN, DEPRESSED OR HOPELESS: NOT AT ALL
SUM OF ALL RESPONSES TO PHQ QUESTIONS 1-9: 0
SUM OF ALL RESPONSES TO PHQ QUESTIONS 1-9: 0
1. LITTLE INTEREST OR PLEASURE IN DOING THINGS: NOT AT ALL
SUM OF ALL RESPONSES TO PHQ QUESTIONS 1-9: 0
SUM OF ALL RESPONSES TO PHQ QUESTIONS 1-9: 0

## 2025-04-02 ASSESSMENT — ENCOUNTER SYMPTOMS: RESPIRATORY NEGATIVE: 1

## 2025-04-02 NOTE — PROGRESS NOTES
Debra Rosario is a 55 y.o. female presents with   Chief Complaint   Patient presents with    Follow-up    Results    Facial Pain    Pharyngitis     55-year-old female patient presents today for follow-up to review lab work.  In reviewing lab work her liver enzymes are elevated along with her hepatitis C antibody is positive.  She states she has been told her liver enzymes have been elevated recently in the last year.  There is an ultrasound that was negative on 2024.  I discussed with her having more lab work done and we will refer her to [facility] liver specialist after the labs have resulted.  Of note she is vaccinated for hepatitis B.    She does complain also today of ear pain, facial pain/teeth pain, sinus pressure.  She states this has been ongoing for 7 days or longer she has tried multiple OTC medications with very little relief.    I also did discuss with her taking an over-the-counter vitamin D3 supplement as her numbers are on the lower end of normal.  She verbalized understanding.   Diagnosis   1. Hepatitis C antibody detected       2. Acute non-recurrent maxillary sinusitis         /74 (BP Site: Right Upper Arm, Patient Position: Sitting, BP Cuff Size: Medium Adult)   Pulse 69   Temp 97.8 °F (36.6 °C) (Temporal)   Resp 18   Ht 1.613 m (5' 3.5\")   Wt 48.5 kg (107 lb)   SpO2 96%   BMI 18.66 kg/m²   Subjective:     Past Medical History:   Diagnosis Date    Hypercholesterolemia     Renal calculi      Past Surgical History:   Procedure Laterality Date     SECTION      CHOLECYSTECTOMY      SPLENECTOMY      UROLOGICAL SURGERY  2025    CYSTOSCOPY, BILATERAL RETROGRADE PYELOGRAPHY, BILATERAL URETEROSCOPY, RIGHT LASER LITHOTRIPSY, RIGHT URETERAL STENT PLACEMENT;      PARAS SMILEY MD     Social History     Socioeconomic History    Marital status:      Spouse name: None    Number of children: None    Years of education: None    Highest education level: None   Tobacco Use

## 2025-04-02 NOTE — PROGRESS NOTES
Debra Rosario presents today for   Chief Complaint   Patient presents with    Follow-up    Results    Facial Pain    Pharyngitis       Is someone accompanying this pt? no    Is the patient using any DME equipment during OV? no    Depression Screenin/2/2025     1:11 PM 3/12/2025     1:35 PM   PHQ-9 Questionaire   Little interest or pleasure in doing things 0 0   Feeling down, depressed, or hopeless 0 0   PHQ-9 Total Score 0 0       Fall Risk       No data to display                 Health Maintenance reviewed and discussed and ordered per Provider.    Health Maintenance Due   Topic Date Due    Hib vaccine (1 of 1 - Risk 1-dose series) Never done    Meningococcal (ACWY) vaccine (1 - Risk 2-dose series) Never done    Meningococcal B vaccine (1 of 5 - Increased Risk) Never done    Pneumococcal 50+ years Vaccine (1 of 2 - PCV) Never done    Cervical cancer screen  Never done    Breast cancer screen  Never done    Colorectal Cancer Screen  Never done    Shingles vaccine (1 of 2) 2019    Lung Cancer Screening &/or Counseling  Never done    DTaP/Tdap/Td vaccine (2 - Td or Tdap) 2022    COVID-19 Vaccine ( season) Never done   .      \"Have you been to the ER, urgent care clinic since your last visit?  Hospitalized since your last visit?\"    NO    “Have you seen or consulted any other health care providers outside our system since your last visit?”    NO    Have you had a mammogram?”   NO - ordered    No breast cancer screening on file      “Have you had a pap smear?”    NO - scheduled    No cervical cancer screening on file       “Have you had a colorectal cancer screening such as a colonoscopy/FIT/Cologuard?    NO- ordered    No colonoscopy on file  No cologuard on file  No FIT/FOBT on file   No flexible sigmoidoscopy on file       Click Here for Release of Records Request

## 2025-04-14 ENCOUNTER — SCHEDULED TELEPHONE ENCOUNTER (OUTPATIENT)
Age: 55
End: 2025-04-14

## 2025-04-14 DIAGNOSIS — Z12.11 ENCOUNTER FOR SCREENING COLONOSCOPY: ICD-10-CM

## 2025-04-16 ENCOUNTER — HOSPITAL ENCOUNTER (OUTPATIENT)
Age: 55
Setting detail: SPECIMEN
Discharge: HOME | End: 2025-04-19

## 2025-04-16 PROBLEM — Z12.11 ENCOUNTER FOR SCREENING COLONOSCOPY: Status: ACTIVE | Noted: 2025-04-14

## 2025-04-16 LAB — LABCORP DRAW FEE: NORMAL

## 2025-04-16 PROCEDURE — 99001 SPECIMEN HANDLING PT-LAB: CPT

## 2025-04-16 NOTE — PROGRESS NOTES
Patient scheduled for a colonoscopy on 5/20/2025  Instructions emailed, mailed and sent through [employer] sent  Surgical Registration Form scanned.  Case Request Opened

## 2025-04-17 LAB — SPECIMEN STATUS REPORT: NORMAL

## 2025-04-18 LAB
DIAGNOSTIC IMP SPEC-IMP: NORMAL
HCV AB SERPL QL IA: REACTIVE
HCV RNA SERPL NAA+PROBE-ACNC: NORMAL IU/ML
HCV RNA SERPL NAA+PROBE-LOG IU: 6.37 LOG10 IU/ML
REF LAB TEST REF RANGE: NORMAL

## 2025-04-22 ENCOUNTER — RESULTS FOLLOW-UP (OUTPATIENT)
Dept: FAMILY MEDICINE CLINIC | Facility: CLINIC | Age: 55
End: 2025-04-22

## 2025-04-22 DIAGNOSIS — B19.20 HEPATITIS C TEST POSITIVE: Primary | ICD-10-CM

## 2025-05-09 ENCOUNTER — ANESTHESIA EVENT (OUTPATIENT)
Age: 55
End: 2025-05-09
Payer: MEDICAID

## 2025-05-09 RX ORDER — ALBUTEROL SULFATE 0.83 MG/ML
2.5 SOLUTION RESPIRATORY (INHALATION) AS NEEDED
Status: CANCELLED | OUTPATIENT
Start: 2025-05-09

## 2025-05-09 RX ORDER — SODIUM CHLORIDE 0.9 % (FLUSH) 0.9 %
5-40 SYRINGE (ML) INJECTION EVERY 12 HOURS SCHEDULED
Status: CANCELLED | OUTPATIENT
Start: 2025-05-09

## 2025-05-09 RX ORDER — SODIUM CHLORIDE 0.9 % (FLUSH) 0.9 %
5-40 SYRINGE (ML) INJECTION PRN
Status: CANCELLED | OUTPATIENT
Start: 2025-05-09

## 2025-05-09 RX ORDER — SODIUM CHLORIDE 9 MG/ML
INJECTION, SOLUTION INTRAVENOUS PRN
Status: CANCELLED | OUTPATIENT
Start: 2025-05-09

## 2025-05-09 RX ORDER — SODIUM CHLORIDE, SODIUM LACTATE, POTASSIUM CHLORIDE, CALCIUM CHLORIDE 600; 310; 30; 20 MG/100ML; MG/100ML; MG/100ML; MG/100ML
INJECTION, SOLUTION INTRAVENOUS CONTINUOUS
Status: CANCELLED | OUTPATIENT
Start: 2025-05-09

## 2025-05-14 ENCOUNTER — TRANSCRIBE ORDERS (OUTPATIENT)
Facility: HOSPITAL | Age: 55
End: 2025-05-14

## 2025-05-14 DIAGNOSIS — B18.2 CHRONIC HEPATITIS C WITH HEPATIC COMA (HCC): Primary | ICD-10-CM

## 2025-05-16 PROBLEM — Z12.11 ENCOUNTER FOR SCREENING COLONOSCOPY: Status: RESOLVED | Noted: 2025-04-14 | Resolved: 2025-05-16

## 2025-05-19 ENCOUNTER — PREP FOR PROCEDURE (OUTPATIENT)
Age: 55
End: 2025-05-19

## 2025-05-19 DIAGNOSIS — Z12.11 COLON CANCER SCREENING: Primary | ICD-10-CM

## 2025-05-19 RX ORDER — SODIUM CHLORIDE, SODIUM LACTATE, POTASSIUM CHLORIDE, CALCIUM CHLORIDE 600; 310; 30; 20 MG/100ML; MG/100ML; MG/100ML; MG/100ML
INJECTION, SOLUTION INTRAVENOUS CONTINUOUS
Status: CANCELLED | OUTPATIENT
Start: 2025-05-19

## 2025-05-19 NOTE — H&P
Open access updated H&P.      Brief history: The patient is female White (non-) 55 y.o. who was referred for screening colonoscopy.  Review of the records showed that they had few if any health problems, excessive obesity, or significant medications that would require office evaluation prior to colonoscopy for colon cancer screening.  After review of their chart, contact was made with the patient in discussion and literature sent regarding the procedure and the bowel preparation.  They are here now for their procedure.      Past medical and surgical history:   Past Medical History:   Diagnosis Date    Hypercholesterolemia     Renal calculi       Past Surgical History:   Procedure Laterality Date     SECTION      CHOLECYSTECTOMY      SPLENECTOMY      UROLOGICAL SURGERY  2025    CYSTOSCOPY, BILATERAL RETROGRADE PYELOGRAPHY, BILATERAL URETEROSCOPY, RIGHT LASER LITHOTRIPSY, RIGHT URETERAL STENT PLACEMENT;      PARAS SMILEY MD        Allergies:    Allergies   Allergen Reactions    Codeine Hives and Swelling    Penicillins Hives and Swelling        Medications:  No current facility-administered medications for this encounter.    Current Outpatient Medications:     ondansetron (ZOFRAN-ODT) 4 MG disintegrating tablet, Take 1 tablet by mouth every 8 hours as needed for Nausea, Disp: 30 tablet, Rfl: 1    fluticasone (FLONASE) 50 MCG/ACT nasal spray, 1 spray by Each Nostril route daily as needed for Rhinitis, Disp: 16 g, Rfl: 1    aspirin 81 MG EC tablet, Take 1 tablet by mouth daily, Disp: 30 tablet, Rfl: 3    rosuvastatin (CRESTOR) 10 MG tablet, Take 1 tablet by mouth daily, Disp: 90 tablet, Rfl: 1     Family history:  The patient has a family history of no known GI disease.    Social history :    Social Connections: Not on file        ROS:   Review of Systems - negative     Vital signs:  Ht 1.626 m (5' 4\")   Wt 49.4 kg (109 lb)   BMI 18.71 kg/m²     PE: Healthy appearing female  HEENT: Normocephalic

## 2025-05-20 ENCOUNTER — HOSPITAL ENCOUNTER (OUTPATIENT)
Age: 55
Setting detail: OUTPATIENT SURGERY
Discharge: HOME | End: 2025-05-20
Attending: INTERNAL MEDICINE | Admitting: INTERNAL MEDICINE
Payer: MEDICAID

## 2025-05-20 ENCOUNTER — ANESTHESIA (OUTPATIENT)
Age: 55
End: 2025-05-20
Payer: MEDICAID

## 2025-05-20 VITALS
HEART RATE: 80 BPM | OXYGEN SATURATION: 99 % | RESPIRATION RATE: 14 BRPM | BODY MASS INDEX: 17.75 KG/M2 | HEIGHT: 64 IN | DIASTOLIC BLOOD PRESSURE: 74 MMHG | TEMPERATURE: 97.1 F | SYSTOLIC BLOOD PRESSURE: 112 MMHG | WEIGHT: 104 LBS

## 2025-05-20 DIAGNOSIS — Z12.11 COLON CANCER SCREENING: ICD-10-CM

## 2025-05-20 PROCEDURE — 7100000011 HC PHASE II RECOVERY - ADDTL 15 MIN: Performed by: INTERNAL MEDICINE

## 2025-05-20 PROCEDURE — 2709999900 HC NON-CHARGEABLE SUPPLY: Performed by: INTERNAL MEDICINE

## 2025-05-20 PROCEDURE — 2500000003 HC RX 250 WO HCPCS: Performed by: NURSE ANESTHETIST, CERTIFIED REGISTERED

## 2025-05-20 PROCEDURE — 3600007501: Performed by: INTERNAL MEDICINE

## 2025-05-20 PROCEDURE — 2580000003 HC RX 258: Performed by: INTERNAL MEDICINE

## 2025-05-20 PROCEDURE — 7100000010 HC PHASE II RECOVERY - FIRST 15 MIN: Performed by: INTERNAL MEDICINE

## 2025-05-20 PROCEDURE — 3600007511: Performed by: INTERNAL MEDICINE

## 2025-05-20 PROCEDURE — 6360000002 HC RX W HCPCS: Performed by: NURSE ANESTHETIST, CERTIFIED REGISTERED

## 2025-05-20 PROCEDURE — 3700000001 HC ADD 15 MINUTES (ANESTHESIA): Performed by: INTERNAL MEDICINE

## 2025-05-20 PROCEDURE — 45378 DIAGNOSTIC COLONOSCOPY: CPT | Performed by: INTERNAL MEDICINE

## 2025-05-20 PROCEDURE — 3700000000 HC ANESTHESIA ATTENDED CARE: Performed by: INTERNAL MEDICINE

## 2025-05-20 RX ORDER — PROPOFOL 10 MG/ML
INJECTION, EMULSION INTRAVENOUS
Status: DISCONTINUED | OUTPATIENT
Start: 2025-05-20 | End: 2025-05-20 | Stop reason: SDUPTHER

## 2025-05-20 RX ORDER — EPHEDRINE SULFATE/0.9% NACL/PF 25 MG/5 ML
SYRINGE (ML) INTRAVENOUS
Status: DISCONTINUED | OUTPATIENT
Start: 2025-05-20 | End: 2025-05-20 | Stop reason: SDUPTHER

## 2025-05-20 RX ORDER — SODIUM CHLORIDE, SODIUM LACTATE, POTASSIUM CHLORIDE, CALCIUM CHLORIDE 600; 310; 30; 20 MG/100ML; MG/100ML; MG/100ML; MG/100ML
INJECTION, SOLUTION INTRAVENOUS CONTINUOUS
Status: DISCONTINUED | OUTPATIENT
Start: 2025-05-20 | End: 2025-05-20 | Stop reason: HOSPADM

## 2025-05-20 RX ADMIN — SODIUM CHLORIDE, SODIUM LACTATE, POTASSIUM CHLORIDE, AND CALCIUM CHLORIDE: .6; .31; .03; .02 INJECTION, SOLUTION INTRAVENOUS at 10:38

## 2025-05-20 RX ADMIN — PROPOFOL 100 MG: 10 INJECTION, EMULSION INTRAVENOUS at 11:19

## 2025-05-20 RX ADMIN — LIDOCAINE HYDROCHLORIDE 80 MG: 20 INJECTION, SOLUTION INFILTRATION; PERINEURAL at 11:14

## 2025-05-20 RX ADMIN — EPHEDRINE SULFATE 10 MG: 5 INJECTION INTRAVENOUS at 11:17

## 2025-05-20 RX ADMIN — PROPOFOL 100 MG: 10 INJECTION, EMULSION INTRAVENOUS at 11:14

## 2025-05-20 RX ADMIN — PROPOFOL 100 MG: 10 INJECTION, EMULSION INTRAVENOUS at 11:28

## 2025-05-20 RX ADMIN — PROPOFOL 100 MG: 10 INJECTION, EMULSION INTRAVENOUS at 11:24

## 2025-05-20 ASSESSMENT — PAIN - FUNCTIONAL ASSESSMENT
PAIN_FUNCTIONAL_ASSESSMENT: NONE - DENIES PAIN
PAIN_FUNCTIONAL_ASSESSMENT: ADULT NONVERBAL PAIN SCALE (NPVS)

## 2025-05-20 ASSESSMENT — LIFESTYLE VARIABLES: SMOKING_STATUS: 1

## 2025-05-20 NOTE — ANESTHESIA POSTPROCEDURE EVALUATION
Department of Anesthesiology  Postprocedure Note    Patient: Debra Rosario  MRN: [id number]  YOB: 1970  Date of evaluation: 5/20/2025    Procedure Summary       Date: 05/20/25 Room / Location: [facility] ENDO 01 / [facility] ENDOSCOPY    Anesthesia Start: 1057 Anesthesia Stop: 1129    Procedure: Colonoscopy (Lower GI Region) Diagnosis:       Encounter for screening colonoscopy      (Encounter for screening colonoscopy [Z12.11])    Surgeons: Brad Quintana MD Responsible Provider: Ellen Caicedo APRN - CRNA    Anesthesia Type: MAC ASA Status: 2            Anesthesia Type: MAC    Susy Phase I: Susy Score: 10    Susy Phase II:      Anesthesia Post Evaluation    Patient location during evaluation: PACU  Airway patency: patent  Nausea & Vomiting: no nausea and no vomiting  Cardiovascular status: hemodynamically stable  Respiratory status: spontaneous ventilation and room air  Hydration status: stable    No notable events documented.

## 2025-05-20 NOTE — OP NOTE
Colonoscopy procedure note    Date of service: 5/20/2025    Type:  Screening    Indication for procedure: Colon cancer screening    Anesthesia classification: ASA class 2    Patient history and physical been accomplished and documented.  Patient is assessed and determined to be appropriate candidate for planned procedure and sedation; patient reassessed immediately prior to sedation.      Sedation plan: MAC per anesthesia    Surgical assistant: Not applicable    Airway assessment: Range of motion: Normal, mouth opening, Visual obstruction: No.    UPDATED PREOP EXAM:  Unchanged.    VS: Reviewed  Gen: in NAD  CV: RRR, no murmur  Resp: CTA  Abd: Soft, NTND, +BS  Extrem: No cyanosis or edema  Neuro: Awake and alert    Informed consent obtained: Yes.  The indications, risks including but not limited to bleeding, perforation, infection, death, and potential failure to visual areas are diagnosed neoplasia, alternatives and benefits were discussed with the patient prior to the procedure.  Patient identity and procedure was verified, absent was obtained, and is consistent with the consent form found in the patient's records.    PROCEDURE PERFORMED:  COLONOSCOPY  to the cecum with MAC     INSTRUMENT: Olympus colonoscope per nursing notes.    FINDINGS:    External anal lesions: Normal   Rectum: normal.  Rectal sphincter tone was normal.   Retroflexion view: Unable to retroflex due to small rectal pouch.  No abnormality seen upon slow withdrawal of the endoscope.  Sigmoid: normal   Descending Colon: normal   Transverse Colon: normal   Ascending Colon: normal   Cecum: normal, including the appendiceal orifice and ileocecal valve.    Terminal ileum: not evaluated     Specimens: none     Bowel preparation- adequate to detect small (5mm) polyps or larger.    Estimated blood loss: none   Complications:  none   Cecal withdrawal time: 6 minutes.    Comments:  none.   While colonoscopy is the best test to prevent and detect cancer, it

## 2025-05-20 NOTE — INTERVAL H&P NOTE
Update History & Physical    The patient's History and Physical of May 20, 2025 was reviewed with the patient and I examined the patient. There was no change. The surgical site was confirmed by the patient and me.     Plan: The risks, benefits, expected outcome, and alternative to the recommended procedure have been discussed with the patient. Patient understands and wants to proceed with the procedure.     Electronically signed by Brad Quintana MD on 5/20/2025 at 10:18 AM

## 2025-05-20 NOTE — ANESTHESIA PRE PROCEDURE
Department of Anesthesiology  Preprocedure Note       Name:  Debra Rosario   Age:  55 y.o.  :  1970                                          MRN:  [id number]         Date:  2025      Surgeon: Surgeon(s):  Brad Quintana MD    Procedure: Procedure(s):  Colonoscopy    Medications prior to admission:   Prior to Admission medications    Medication Sig Start Date End Date Taking? Authorizing Provider   ondansetron (ZOFRAN-ODT) 4 MG disintegrating tablet Take 1 tablet by mouth every 8 hours as needed for Nausea 25   Angeles Wiley APRN - NP   fluticasone (FLONASE) 50 MCG/ACT nasal spray 1 spray by Each Nostril route daily as needed for Rhinitis 25   Angeles Wiley APRN - NP   aspirin 81 MG EC tablet Take 1 tablet by mouth daily 25   Angeles Wiley APRN - NP   rosuvastatin (CRESTOR) 10 MG tablet Take 1 tablet by mouth daily 25   Angeles Wiley APRN - NP       Current medications:    No current facility-administered medications for this encounter.       Allergies:    Allergies   Allergen Reactions   • Codeine Hives and Swelling   • Penicillins Hives and Swelling       Problem List:    Patient Active Problem List   Diagnosis Code   (none) - all problems resolved or deleted       Past Medical History:        Diagnosis Date   • Hypercholesterolemia    • Renal calculi        Past Surgical History:        Procedure Laterality Date   •  SECTION     • CHOLECYSTECTOMY     • SPLENECTOMY     • UROLOGICAL SURGERY  2025    CYSTOSCOPY, BILATERAL RETROGRADE PYELOGRAPHY, BILATERAL URETEROSCOPY, RIGHT LASER LITHOTRIPSY, RIGHT URETERAL STENT PLACEMENT;      PARAS SMILEY MD       Social History:    Social History     Tobacco Use   • Smoking status: Every Day     Current packs/day: 0.50     Average packs/day: 0.5 packs/day for 41.4 years (20.7 ttl pk-yrs)     Types: Cigarettes     Start date:    • Smokeless tobacco: Never   Substance Use Topics   • Alcohol use: Yes

## 2025-05-20 NOTE — DISCHARGE INSTRUCTIONS
Impression:  Normal colonoscopy to the cecum.  No polyps or masses were seen.     Recommendations:  Repeat colonoscopy in  10 years for screening.  Follow up as needed.

## 2025-05-22 ENCOUNTER — HOSPITAL ENCOUNTER (OUTPATIENT)
Age: 55
Discharge: HOME | End: 2025-05-25
Payer: MEDICAID

## 2025-05-22 DIAGNOSIS — B18.2 CHRONIC HEPATITIS C WITH HEPATIC COMA (HCC): ICD-10-CM

## 2025-05-22 PROCEDURE — 76705 ECHO EXAM OF ABDOMEN: CPT

## 2025-07-02 ENCOUNTER — OFFICE VISIT (OUTPATIENT)
Dept: FAMILY MEDICINE CLINIC | Facility: CLINIC | Age: 55
End: 2025-07-02
Payer: MEDICAID

## 2025-07-02 VITALS
WEIGHT: 107 LBS | TEMPERATURE: 98.1 F | BODY MASS INDEX: 18.27 KG/M2 | HEIGHT: 64 IN | HEART RATE: 70 BPM | OXYGEN SATURATION: 96 % | SYSTOLIC BLOOD PRESSURE: 130 MMHG | DIASTOLIC BLOOD PRESSURE: 85 MMHG

## 2025-07-02 DIAGNOSIS — R76.8 HEPATITIS C ANTIBODY DETECTED: Primary | ICD-10-CM

## 2025-07-02 PROCEDURE — 99213 OFFICE O/P EST LOW 20 MIN: CPT

## 2025-07-02 RX ORDER — ASCORBIC ACID 125 MG
TABLET,CHEWABLE ORAL
COMMUNITY
End: 2025-07-14 | Stop reason: CLARIF

## 2025-07-02 ASSESSMENT — PATIENT HEALTH QUESTIONNAIRE - PHQ9
1. LITTLE INTEREST OR PLEASURE IN DOING THINGS: NOT AT ALL
SUM OF ALL RESPONSES TO PHQ QUESTIONS 1-9: 0
SUM OF ALL RESPONSES TO PHQ QUESTIONS 1-9: 0
2. FEELING DOWN, DEPRESSED OR HOPELESS: NOT AT ALL
SUM OF ALL RESPONSES TO PHQ QUESTIONS 1-9: 0
SUM OF ALL RESPONSES TO PHQ QUESTIONS 1-9: 0

## 2025-07-23 ASSESSMENT — ENCOUNTER SYMPTOMS: RESPIRATORY NEGATIVE: 1

## 2025-07-23 NOTE — PROGRESS NOTES
Debranina Rosario presents today for   Chief Complaint   Patient presents with    Follow-up     3 month follow up.        Is someone accompanying this pt? No     Is the patient using any DME equipment during OV? No     Depression Screenin/2/2025     1:32 PM 2025     1:11 PM 3/12/2025     1:35 PM   PHQ-9 Questionaire   Little interest or pleasure in doing things 0 0 0   Feeling down, depressed, or hopeless 0 0 0   PHQ-9 Total Score 0 0 0       Fall Risk       No data to display                 Health Maintenance reviewed and discussed and ordered per Provider.    Health Maintenance Due   Topic Date Due    Hib vaccine (1 of 1 - Risk 1-dose series) Never done    Meningococcal (ACWY) vaccine (1 - Risk 2-dose series) Never done    Meningococcal B vaccine (1 of 5 - Increased Risk) Never done    Hepatitis A vaccine (1 of 2 - Risk 2-dose series) Never done    Pneumococcal 50+ years Vaccine (1 of 2 - PCV) Never done    Cervical cancer screen  Never done    Breast cancer screen  Never done    Shingles vaccine (1 of 2) 2019    Lung Cancer Screening &/or Counseling  Never done    DTaP/Tdap/Td vaccine (2 - Td or Tdap) 2022    COVID-19 Vaccine ( season) Never done   .        \"Have you been to the ER, urgent care clinic since your last visit?  Hospitalized since your last visit?\"    NO    “Have you seen or consulted any other health care providers outside of [facility] since your last visit?”    YES - When: approximately 3  weeks ago.  [facility] and [facility]               Have you had a mammogram?”   NO    No breast cancer screening on file      “Have you had a pap smear?”    YES - Where: [facility] Nurse/CMA to request most recent records if not in the chart    No cervical cancer screening on file         Click Here for Release of Records Request    
(Non-Medical): No   Physical Activity: Inactive (3/11/2025)    Exercise Vital Sign     Days of Exercise per Week: 0 days     Minutes of Exercise per Session: 0 min   Housing Stability: Low Risk  (3/12/2025)    Housing Stability Vital Sign     Unable to Pay for Housing in the Last Year: No     Number of Times Moved in the Last Year: 1     Homeless in the Last Year: No       Allergies   Allergen Reactions    Codeine Hives and Swelling    Penicillins Hives and Swelling     The patient has a family history of    Current Outpatient Medications   Medication Instructions    aspirin 81 mg, Oral, DAILY    rosuvastatin (CRESTOR) 10 mg, Oral, DAILY         REVIEW OF SYSTEMS  Review of Systems   Constitutional: Negative.    Respiratory: Negative.     Cardiovascular: Negative.    Neurological: Negative.           Objective:     PHYSICAL EXAM  Physical Exam  Vitals and nursing note reviewed.   Constitutional:       Appearance: Normal appearance.   Pulmonary:      Effort: Pulmonary effort is normal.   Skin:     General: Skin is warm and dry.   Neurological:      Mental Status: She is alert and oriented to person, place, and time.   Psychiatric:         Mood and Affect: Mood normal.         Behavior: Behavior normal.           Assessment/Plan:     1. Hepatitis C antibody detected      - Continue following up with hepatology.         Medication List            Accurate as of July 2, 2025 11:59 PM. If you have any questions, ask your nurse or doctor.                CONTINUE taking these medications      aspirin 81 MG EC tablet  Take 1 tablet by mouth daily     rosuvastatin 10 MG tablet  Commonly known as: CRESTOR  Take 1 tablet by mouth daily              KAYLA Humphries - IVAN                          Disclaimer:    I have discussed the diagnosis with the patient and the intended plan as seen above.The patient understands our medical plan. The risks, benefits and significant side effects of all medications have been reviewed.

## 2025-08-18 ENCOUNTER — HOSPITAL ENCOUNTER (OUTPATIENT)
Age: 55
Discharge: HOME | End: 2025-08-21
Payer: MEDICAID

## 2025-08-18 VITALS — HEIGHT: 64 IN | BODY MASS INDEX: 18.1 KG/M2 | WEIGHT: 106 LBS

## 2025-08-18 DIAGNOSIS — Z12.31 ENCOUNTER FOR SCREENING MAMMOGRAM FOR BREAST CANCER: ICD-10-CM

## 2025-08-18 PROCEDURE — 77063 BREAST TOMOSYNTHESIS BI: CPT

## 2025-08-27 ENCOUNTER — PATIENT MESSAGE (OUTPATIENT)
Dept: FAMILY MEDICINE CLINIC | Facility: CLINIC | Age: 55
End: 2025-08-27

## 2025-08-27 DIAGNOSIS — T75.3XXD MOTION SICKNESS, SUBSEQUENT ENCOUNTER: Primary | ICD-10-CM

## 2025-08-27 RX ORDER — SCOPOLAMINE 1 MG/3D
1 PATCH, EXTENDED RELEASE TRANSDERMAL
Qty: 4 PATCH | Refills: 0 | Status: SHIPPED | OUTPATIENT
Start: 2025-08-27

## (undated) DEVICE — TUBING, SUCTION, 9/32" X 10', STRAIGHT: Brand: MEDLINE

## (undated) DEVICE — KIT COLON W/ 1.1OZ LUB AND 2 END

## (undated) DEVICE — SOLUTION IRRIG 500ML STRL H2O NONPYROGENIC

## (undated) DEVICE — Device: Brand: DEFENDO VALVE AND CONNECTOR KIT

## (undated) DEVICE — SOLUTION IRRIG 1000ML STRL H2O USP PLAS POUR BTL

## (undated) DEVICE — TUBING IRRIGATION BK FLO VLV FOR OFP ENDOSTAT ENDOGATOR DISP

## (undated) DEVICE — TUBING INSUFFLATION CAP W/ EXT CARBON DIOX ENDO SMARTCAP